# Patient Record
Sex: FEMALE | Race: WHITE
[De-identification: names, ages, dates, MRNs, and addresses within clinical notes are randomized per-mention and may not be internally consistent; named-entity substitution may affect disease eponyms.]

---
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
## 2018-04-20 ENCOUNTER — HOSPITAL ENCOUNTER (EMERGENCY)
Dept: HOSPITAL 25 - UCCORT | Age: 18
Discharge: HOME | End: 2018-04-20
Payer: COMMERCIAL

## 2018-04-20 VITALS — SYSTOLIC BLOOD PRESSURE: 137 MMHG | DIASTOLIC BLOOD PRESSURE: 78 MMHG

## 2018-04-20 DIAGNOSIS — L01.00: Primary | ICD-10-CM

## 2018-04-20 PROCEDURE — G0463 HOSPITAL OUTPT CLINIC VISIT: HCPCS

## 2018-04-20 PROCEDURE — 99212 OFFICE O/P EST SF 10 MIN: CPT

## 2018-04-20 NOTE — XMS REPORT
Miley Doyle

 Created on:April 3, 2018



Patient:Miley Doyle

Sex:Female

:2000

External Reference #:[id number]





Demographics







 Address  [address] [place] [address]

 

 Home Phone  [phone]

 

 Mobile Phone  [phone]

 

 Email Address  [email or web address]

 

 Preferred Language  English

 

 Marital Status  Declined to Specify/Unknown

 

 Judaism Affiliation  Unknown

 

 Race  White

 

 Ethnic Group  Declined to Specify/Unknown









Author







 Organization  [facility]

 

 Address  [address] [address]

 

 Phone  [phone]









Support







 Name  Relationship  Address  Phone

 

 Libia Govea  Mother  Unavailable  [phone]

 

 Ollie Caruso  Significant Other  Unavailable  [phone]









Care Team Providers







 Name  Role  Phone

 

 Karen Gabriel, NP  Primary Care Physician  Unavailable









Payers







 Type  Date  Identification Numbers  Payment Provider  Subscriber

 

 Commercial    Policy Number: [id number]  [facility]  Miley Doyle









 PayID: [id number]  [address]









 [place] [address]







Problems







 Date  Description  Provider  Status

 

 Onset: 2017  Generalized anxiety disorder  Karen Gabriel, NP  
Active

 

 Onset: 2017  Migraine without aura, not  Digiovanna, Karen, NP  Active



   refractory    

 

 Onset: 2017  von Willebrand disorder  DigiovannaKayleneKaren, NP  Active







Family History







 Date  Family Member(s)  Problem(s)  Comments

 

   General  Cancer, Breast  multiple members on mother's side, not mother



       or MGM

 

   General  Heart Disease  MGF has pacemaker

 

   General  ADHD  brother

 

   Mother  Thyroid Disease  

 

   Mother  Hypertension  

 

   Grandfather  Cancer, Bone  

 

   Grandmother  Cancer, Lung  







Social History







 Type  Date  Description  Comments

 

 Education    Currently working on Vocational Degree  working on [employer]

 

 Marital Status    Single  

 

 Occupation      

 

 Work Status    Currently Working  [employer]

 

 ETOH Use    Denies alcohol use  

 

 Smoking    Patient has never smoked  

 

 Daily Caffeine    Does Not Consume Caffeine  







Allergies, Adverse Reactions, Alerts







 Date  Description  Reaction  Status  Severity  Comments

 

 2017  Lactose    active    

 

 2017  NKDA    inactive    







Medications







 Medication  Date  Status  Form  Strength  Qnty  SIG  Indications  Ordering



                 Provider

 

 Imipramine HCL  00/00/0  Active  Tablets  25mg  30tabs  Take One  F41.1  
Digiovanna,



   000          Tablet By    Karen,



             Mouth At    NP



             Bedtime    









 F33.9









 Hydroxychloroquine    Active  Tablets  200mg    1 po bid    Anabella Kovacs DR

 

                 

 

 Buspirone HCL  10/10/2017 -  Hx  Tablets  5mg  60tabs  1 po  F41  Digiovanna,



   2017          daily  .1  Karen,



             for 1    NP



             week    



             then    



             twice    



             daily    

 

 Vitamin D  2017 -  Hx  Tablets  2000Unit  OTC  1 by    Digiovanna,



   11/10/2017          mouth    Karen,



             twice    NP



             every    



             day    







Immunizations







 CPT Code  Status  Date  Vaccine  Reaction  Lot #

 

 [id number]  Given  12/10/2013  HPV Vaccine (Gardasil) 3 Dose Schedule  nysiis  

 

 82560  Given  2012  HPV Vaccine (Gardasil) 3 Dose Schedule  nysiis  

 

 67304  Given  2011  Menactra/Menveo Meningococcal Vaccine  nysiis  

 

 32877  Given  2011  HPV Vaccine (Gardasil) 3 Dose Schedule  nysiis  

 

 59675  Given  09/15/2010  Varicella (Chicken Pox) Immunization  nysiis  

 

 76883  Given  09/15/2010  Tdap (Adacel) Ages 7 And Above Only  nysiis  

 

 57964  Given  2003  IPV / Poliomyelitis Immunization  nysiis  

 

 98722  Given  2003  MMR Virus Immunization  nysiis  

 

 39376  Given  2003  DTaP Immunization 7 Yrs &amp; Younger  nysiis  

 

 86764  Given  2000  Hib ACTHiB Vaccine 4 Dose Schedule  nysiis  

 

 29262  Given  2000  DTaP Immunization 7 Yrs &amp; Younger  nysiis  

 

 90535  Given  2000  Varicella (Chicken Pox) Immunization  nysiis  

 

 66440  Given  2000  Hepatitis B Vac Ped/Adolescent 3 Dose  nysiis  



       Schedule    

 

 32993  Given  2000  Hepatitis B Vac Ped/Adolescent 3 Dose  nysiis  



       Schedule    

 

 82010  Given  2000  IPV / Poliomyelitis Immunization  nysiis  

 

 32144  Given  2000  DTaP Immunization 7 Yrs &amp; Younger  nysiis  

 

 69942  Given  2000  Hib ACTHiB Vaccine 4 Dose Schedule  nysiis  

 

 11710  Given  2000  IPV / Poliomyelitis Immunization  nysiis  

 

 95288  Given  2000  MMR Virus Immunization  nysiis  

 

 64074  Given  2000  DTaP Immunization 7 Yrs &amp; Younger  nysiis  

 

 81008  Given  2000  Hib ACTHiB Vaccine 4 Dose Schedule  nysiis  

 

 11107  Given  1998  IPV / Poliomyelitis Immunization  nysiis  

 

 24281  Given  1998  DTaP Immunization 7 Yrs &amp; Younger  nysiis  

 

 45574  Given  1998  Hib ACTHiB Vaccine 4 Dose Schedule  nysiis  

 

 03827  Given  1998  Hepatitis B Vac Ped/Adolescent 3 Dose  nysiis  



       Schedule    







Vital Signs







 Date  Vital  Result  Comment

 

 2018  Body Temperature  99.2 F  tympanic









 Weight  196.00 lb  

 

 Weight Percentile  97th  

 

 Heart Rate  88 /min  

 

 BP Systolic  114 mmHg  

 

 BP Diastolic  68 mmHg  

 

 Respiratory Rate  16 /min  

 

 Height  63.5 inches  5'3.50"

 

 Height Percentile  38 %  

 

 BMI (Body Mass Index)  34.2 kg/m2  

 

 Body Mass Index Percentile  97 %  









 2018  Weight  193.00 lb  









 Weight Percentile  97th  

 

 Heart Rate  76 /min  

 

 BP Systolic  118 mmHg  

 

 BP Diastolic  76 mmHg  

 

 Respiratory Rate  18 /min  

 

 Height  63.5 inches  5'3.50"

 

 Height Percentile  38 %  

 

 BMI (Body Mass Index)  33.6 kg/m2  

 

 Body Mass Index Percentile  97 %  









 2017  Weight  191.00 lb  









 Weight Percentile  97th  

 

 Heart Rate  96 /min  

 

 BP Systolic  132 mmHg  

 

 BP Diastolic  88 mmHg  

 

 Respiratory Rate  14 /min  

 

 Height  63.5 inches  5'3.50"

 

 Height Percentile  38 %  

 

 BMI (Body Mass Index)  33.3 kg/m2  

 

 Body Mass Index Percentile  97 %  









 11/10/2017  Weight  193.00 lb  









 Weight Percentile  97th  

 

 Heart Rate  72 /min  

 

 BP Systolic  120 mmHg  

 

 BP Diastolic  78 mmHg  

 

 Respiratory Rate  18 /min  

 

 Height  63.5 inches  5'3.50"

 

 Height Percentile  38 %  

 

 BMI (Body Mass Index)  33.6 kg/m2  

 

 Body Mass Index Percentile  97 %  









 10/24/2017  Weight  189.00 lb  









 Weight Percentile  96th  

 

 Heart Rate  94 /min  

 

 BP Systolic  120 mmHg  

 

 BP Diastolic  70 mmHg  

 

 Respiratory Rate  16 /min  









 10/10/2017  Weight  192.00 lb  









 Weight Percentile  97th  

 

 Heart Rate  106 /min  

 

 BP Systolic  118 mmHg  

 

 BP Diastolic  68 mmHg  

 

 Respiratory Rate  17 /min  

 

 Height  64.5 inches  5'4.50"

 

 Height Percentile  54 %  

 

 BMI (Body Mass Index)  32.4 kg/m2  

 

 Body Mass Index Percentile  96 %  









 09/15/2017  Body Temperature  98.9 F  









 Weight  187.38 lb  

 

 Weight Percentile  96th  

 

 Heart Rate  86 /min  

 

 BP Systolic  142 mmHg  

 

 BP Diastolic  68 mmHg  

 

 BP Systolic Recheck  102 mmHg  

 

 BP Diastolic Recheck  76 mmHg  

 

 Respiratory Rate  18 /min  

 

 Height  64.5 inches  5'4.50"

 

 Height Percentile  54 %  

 

 BMI (Body Mass Index)  31.7 kg/m2  

 

 Body Mass Index Percentile  96 %  









 2017  Body Temperature  97.9 F  









 Weight  185.00 lb  

 

 Weight Percentile  96th  

 

 Heart Rate  84 /min  

 

 BP Systolic  132 mmHg  

 

 BP Diastolic  68 mmHg  

 

 BP Systolic Recheck  108 mmHg  

 

 BP Diastolic Recheck  68 mmHg  

 

 Respiratory Rate  18 /min  

 

 Height  64.5 inches  5'4.50"

 

 Height Percentile  54 %  

 

 BMI (Body Mass Index)  31.3 kg/m2  

 

 Body Mass Index Percentile  95 %  

 

 Last Menstrual Period  2239149  







Results







 Test  Date  Test  Result  H/L  Range  Note

 

 Ua RFX Micro &amp; Culture II  2018  Urine Color  YELLOW    Yellow  1









 Urine Clarity  CLEAR    Clear  1

 

 Urine Glucose - Dipstick  NEGATIVE mg/dL    Negative  1

 

 Urine Bilirubin - Dipstick  NEGATIVE    Negative  1

 

 Urine Ketone  NEGATIVE mg/dL    Negative  1

 

 Urine Specific Gravity  1.010    1.010-1.030  1

 

 Urine Blood  NEGATIVE    Negative  1

 

 Urine PH  7.0    6.5-7.5  1

 

 Urine Protein - Dipstick  NEGATIVE mg/dL    Negative  1

 

 Urine Urobilinogen - Dipstick  0.2 E.U./dL    0.2-1.0  1

 

 Urine Nitrite - Dipstick  NEGATIVE    Negative  1

 

 Urine Leuk Esterase  NEGATIVE    Negative  1

 

 Source:  URINE, CLEAN CAT &lt;SEE NOTE&gt;      1, 2









 Comprehensive Metabolic Panel  2018  Glucose  85 mg/dL      1









 BUN  7 mg/dL    7-18  1

 

 Creatinine  0.7 mg/dL    0.6-1.3  1

 

 Glom Filtration Rate, Estimate  &gt;60 mL/min    &gt;60  1

 

 If   &gt;60 mL/min    &gt;60  1, 3

 

 BUN/Creat  10.0 ratio      1

 

 Sodium  141 mmol/L    136-145  1

 

 Potassium  3.7 mmol/L    3.5-5.1  1

 

 Chloride  107 mmol/L      1

 

 Carbon Dioxide  26 mmol/L    21-32  1

 

 Anion Gap  8 mEq/L    8-16  1

 

 Calcium  8.9 mg/dL    8.5-10.1  1

 

 Total Protein  7.8 g/dL    6.4-8.2  1

 

 Albumin  3.9 g/dL    3.4-5.0  1

 

 Globulin  3.9 g/dL    1.9-4.3  1

 

 Alb/Glob  1.0 ratio      1

 

 Bilirubin,Total  0.5 mg/dL    0.2-1.0  1

 

 Sgot/Ast  16 U/L    15-37  1

 

 SGPT/Alt  19 U/L    12-78  1

 

 Alkaline Phosphatase  62 U/L      1









 Laboratory test finding  2018  CK  97 U/L      1









 Troponin-I  &lt; 0.015 ng/mL      1, 4









 Laboratory test  2018  HCG,Serum (Qualitative)  NEGATIVE    (Negative)  1
, 5



 finding            

 

 CBS W/Automated Diff  2018  White Blood Count  4.9 K/uL    3.1-10.7  1









 Red Blood Count  4.47 M/uL    3.90-5.40  1

 

 Hemoglobin  13.7 gm/dL    11.6-15.8  1

 

 Hematocrit  39.2 %    36.0-46.1  1

 

 Mean Cell Volume  87.7 fl    80.9-99.0  1

 

 Mean Corpuscular HGB  30.6 pg    25.9-32.7  1

 

 Mean Corpuscular HGB Conc  34.9 g/dL  High  30.8-34.3  1

 

 Platelet Count  247 K/uL    155-360  1

 

 Red Cell Distri Width SD  40.1 fl    3-47  1

 

 Red Cell Distri Width %CV  12.9 %    11.7-14.4  1

 

 Mean Platelet Volume  8.9 fL    8.9-12.4  1

 

 Neut%  51.7 %    28.0-68.0  1

 

 Lymph %  38.9 %    20.0-42.0  1

 

 Mono %  8.6 %    4.3-13.2  1

 

 Eo%  0.2 %    0.0-6.6  1

 

 Bas%  0.6 %    0.0-1.1  1

 

 Neut#  2.51 K/uL    1.8-7.0  1

 

 Lymph #  1.89 K/uL    1.0-4.0  1

 

 Mono #  0.42 K/uL    0.3-0.9  1

 

 Eos #  0.01 K/uL    0.0-0.5  1

 

 Baso #  0.03 K/uL    0.0-0.1  1









 Protime  2018  Protime  13.0 seconds    12.0-14.4  1









 Inr  1.0    0.9-1.1  1, 6









 Laboratory test finding  2018  Act Partial Thrombo  32.7 seconds    23.4-
35.0  1, 7



     Time        

 

 CBC + Diff, Plat Count  2018  WBC Num Bld Auto  4.5 10*3/uL    4.5-13  









 RBC Num Bld Auto  4.52 10*6/uL    4.1-5.3  

 

 Hgb Bld-mCnc  13.7 g/dL    11.5-15.5  

 

 Hct VFr Bld Auto  39.2 %    36-45  

 

 MCV RBC Auto  86.8 fL    80-96  

 

 MCH RBC Qn Auto  30.3 pg    27-33  

 

 MCHC RBC Auto-mCnc  34.9 g/dL    32.0-36.0  

 

 RDW RBC Auto-Rto  12.9 %    11.5-14.5  

 

 Platelet Num Bld Auto  247 10*3/uL    150-400  

 

 Differential method Bld  Automated Diff      

 

 Neutrophils/leuk NFr Bld Auto  44 %    28-68  

 

 Lymphocytes/leuk NFr Bld Auto  47 %    13-52  

 

 Monocytes/leuk NFr Bld Auto  8 %    0-11  

 

 Eosinophil/leuk NFr Bld Auto  0 %    0-5  

 

 Basophils/leuk NFr Bld Auto  1 %    0-2  

 

 Neutrophils Num Bld Auto  1.95 10*3/uL    1.8-7.0  

 

 Lymphocytes Num Bld Auto  2.10 10*3/uL    1.2-4.0  

 

 Monocytes Num Bld Auto  0.37 10*3/uL    0-0.8  

 

 Eosinophil Num Bld Auto  0.01 10*3/uL    0-0.5  

 

 Basophils Num Bld Auto  0.03 10*3/uL    0-0.2  

 

 nRBC/100 WBC Bld Auto-Rto  0 /100{WBCs}    0-0  









 Comprehensive Metabolic Pan  2018  Albumin SerPl BCG-mCnc  4.6 g/dL    
3.5-5.2  









 Bilirub SerPl-mCnc  0.6 mg/dL    &lt;1.2  

 

 Calcium SerPl-mCnc  9.5 mg/dL    8.6-10.0  

 

 Chloride SerPl-sCnc  96 mmol/L  Low    

 

 Creat SerPl-mCnc  0.75 mg/dL    0.4-1.0  

 

 Glucose SerPl-mCnc  76 mg/dL      

 

 Alp SerPl-cCnc  54 U/L    45-87  

 

 Potassium SerPl-sCnc  4.3 mmol/L    3.5-5.1  

 

 Prot SerPl-mCnc  7.0 g/dL    6.4-8.3  

 

 Sodium SerPl-sCnc  133 mmol/L  Low  136-145  

 

 Ast SerPl-cCnc  16 U/L    &lt;32  

 

 BUN SerPl-mCnc  14 mg/dL    6-20  

 

 Osmolality SerPl Calc  275 mosm/kg    275-300  

 

 Creat/Urea nit SerPl  19      

 

 Hco3 Ser-sCnc  25 mmol/L    22-29  

 

 Alt SerPl-cCnc  14 U/L    &lt;33  

 

 Anion Gap3 SerPl-sCnc  12 mmol/L    8-15  

 

 Albumin/Glob SerPl  1.9      

 

 GFR/Bsa pred.non black SerPl MDRD-ArVRat  &gt;90 mL/min/1.73m2    &gt;60  

 

 GFR/Bsa pred.black SerPl MDRD-ArVRat  &gt;90 mL/min/1.73m2    &gt;60  









 Laboratory test finding  2018  CRP Highly Sensitive  0.4 mg/L    &lt;3.0
  8









 Rheumatoid Factor Qnt  &lt;10 IU/mL    &lt;14  9

 

 Sed Rate - Esr  5 mm/hr    &lt;20  









 Veronique  2018  Veronique Homogen Titr Ser  250 1/dil  High  0-49  









 Veronique Speckled Titr Ser  1250 1/dil  High  0-49  

 

 Veronique Rim Titr Ser  &lt;50 1/dil    0-49  

 

 Veronique nucleolar Titr Ser  &lt;50 1/dil    0-49  









 Laboratory test finding  01/15/2018  Magnesium  2.1 mg/dL    1.8-2.4  10









 TSH Reflex FT4 and/or FT3  3.05 uIU/mL    0.30-4.20  10

 

 HCG,Serum (Qualitative)  NEGATIVE    (Negative)  10, 11









 Comprehensive Metabolic Panel  01/15/2018  Glucose  72 mg/dL  Low    10









 BUN  9 mg/dL    7-18  10

 

 Creatinine  0.6 mg/dL    0.6-1.3  10

 

 Glom Filtration Rate, Estimate  &gt;60 mL/min    &gt;60  10

 

 If   &gt;60 mL/min    &gt;60  10, 12

 

 BUN/Creat  15.0 ratio      10

 

 Sodium  141 mmol/L    136-145  10

 

 Potassium  4.1 mmol/L    3.5-5.1  10

 

 Chloride  108 mmol/L  High    10

 

 Carbon Dioxide  30 mmol/L    21-32  10

 

 Anion Gap  3 mEq/L  Low  8-16  10

 

 Calcium  9.0 mg/dL    8.5-10.1  10

 

 Total Protein  7.3 g/dL    6.4-8.2  10

 

 Albumin  3.8 g/dL    3.4-5.0  10

 

 Globulin  3.5 g/dL    1.9-4.3  10

 

 Alb/Glob  1.1 ratio      10

 

 Bilirubin,Total  0.4 mg/dL    0.2-1.0  10

 

 Sgot/Ast  15 U/L    15-37  10

 

 SGPT/Alt  21 U/L    12-78  10

 

 Alkaline Phosphatase  57 U/L      10









 Drugs Of Abuse-Urine Screen 7  01/15/2018  Amphetamines (Urine)  Negative      
10









 Barbiturates (Urine)  Negative      10

 

 Benzodiazepines (Urine)  Negative      10

 

 Cannabinoids (Urine)  Negative      10

 

 Cocaine Metabolite (Urine)  Negative      10

 

 Methadone (Urine)  Negative      10

 

 Opiates (Urine)  Negative      10

 

 Urine Cutoffs  *      10, 13









 Ua RFX Micro &amp; Culture II  01/15/2018  Urine Color  YELLOW    Yellow  10









 Urine Clarity  CLEAR    Clear  10

 

 Urine Glucose - Dipstick  NEGATIVE mg/dL    Negative  10

 

 Urine Bilirubin - Dipstick  NEGATIVE    Negative  10

 

 Urine Ketone  NEGATIVE mg/dL    Negative  10

 

 Urine Specific Gravity  &lt;=1.005  Low  1.010-1.030  10

 

 Urine Blood  NEGATIVE    Negative  10

 

 Urine PH  5.5  Low  6.5-7.5  10

 

 Urine Protein - Dipstick  NEGATIVE mg/dL    Negative  10

 

 Urine Urobilinogen - Dipstick  0.2 E.U./dL    0.2-1.0  10

 

 Urine Nitrite - Dipstick  NEGATIVE    Negative  10

 

 Urine Leuk Esterase  NEGATIVE    Negative  10

 

 Source:  URINE, CLEAN CAT &lt;SEE      10, 14



   NOTE&gt;      









 CBS W/Automated Diff  01/15/2018  White Blood Count  3.7 K/uL    3.1-10.7  10









 Red Blood Count  4.48 M/uL    3.90-5.40  10

 

 Hemoglobin  13.4 gm/dL    11.6-15.8  10

 

 Hematocrit  39.7 %    36.0-46.1  10

 

 Mean Cell Volume  88.6 fl    80.9-99.0  10

 

 Mean Corpuscular HGB  29.9 pg    25.9-32.7  10

 

 Mean Corpuscular HGB Conc  33.8 g/dL    30.8-34.3  10

 

 Platelet Count  245 K/uL    155-360  10

 

 Red Cell Distri Width SD  40.9 fl    3-47  10

 

 Red Cell Distri Width %CV  12.9 %    11.7-14.4  10

 

 Mean Platelet Volume  9.0 fL    8.9-12.4  10

 

 Neut%  43.3 %    28.0-68.0  10

 

 Lymph %  48.1 %  High  20.0-42.0  10

 

 Mono %  7.8 %    4.3-13.2  10

 

 Eo%  0.3 %    0.0-6.6  10

 

 Bas%  0.5 %    0.0-1.1  10

 

 Neut#  1.61 K/uL  Low  1.8-7.0  10

 

 Lymph #  1.79 K/uL    1.0-4.0  10

 

 Mono #  0.29 K/uL  Low  0.3-0.9  10

 

 Eos #  0.01 K/uL    0.0-0.5  10

 

 Baso #  0.02 K/uL    0.0-0.1  10









 Vitamin B12 And Folate  01/15/2018  Vitamin B12  1322 pg/mL  High  193-986  10









 Folic Acid  15.5 ng/mL    3.1-17.5  10









 Urine HCG (Qualitative)  2017  Urine HCG (Qualitative)  NEGATIVE    
Negative  15, 16









 Source:  URINE, CLEAN CAT &lt;SEE NOTE&gt;      15, 17









 Ua RFX Micro &amp; Culture II  2017  Urine Color  YELLOW    Yellow  15









 Urine Clarity  CLEAR    Clear  15

 

 Urine Glucose - Dipstick  NEGATIVE mg/dL    Negative  15

 

 Urine Bilirubin - Dipstick  NEGATIVE    Negative  15

 

 Urine Ketone  NEGATIVE mg/dL    Negative  15

 

 Urine Specific Gravity  &lt;=1.005  Low  1.010-1.030  15

 

 Urine Blood  NEGATIVE    Negative  15

 

 Urine PH  6.0  Low  6.5-7.5  15

 

 Urine Protein - Dipstick  NEGATIVE mg/dL    Negative  15

 

 Urine Urobilinogen - Dipstick  0.2 E.U./dL    0.2-1.0  15

 

 Urine Nitrite - Dipstick  NEGATIVE    Negative  15

 

 Urine Leuk Esterase  NEGATIVE    Negative  15

 

 Source:  URINE, CLEAN CAT &lt;SEE      15, 18



   NOTE&gt;      









 Laboratory test finding  2017  Vit D25oh  23 ng/mL  Low    









 TSH  1.90 uIU/mL    0.35-4.94  









 Lipid  2017  Cholesterol  195 mg/dL      









 Triglycerides  48 mg/dL      

 

 HDL  66 mg/dL    35-85  19

 

 Chol/ HDL Ratio  3.0 ratio  Low  3.7-5.6  

 

 VLDL  10 mg/dL    2-29  

 

 LDL (Calc)  120 mg/dL  High  20-99  20









 Comprehensive Met Panel-[facility]  2017  Sodium  140 mmol/L    135-146  21









 Potassium  4.2 mmol/L    3.5-5.2  

 

 Chloride#  103 mmol/L      22

 

 Carbon Dioxide  30 mmol/L    24-34  

 

 Glucose  77 mg/dL      

 

 BUN  9 mg/dL    6-26  

 

 Creatinine  0.7 mg/dL    0.5-1.4  

 

 Calcium  9.7 mg/dL    8.5-10.2  

 

 Total Protein  7.1 g/dL    6.0-8.0  

 

 Albumin  4.5 g/dL    3.6-4.9  

 

 Globulin  2.6 g/dL    2.0-3.5  

 

 A/G Ratio  1.7 Ratio    1.0-2.2  

 

 Total Bilirubin  0.6 mg/dL    0.1-1.3  

 

 Alkaline Phosphatase  59 U/L      

 

 Alt  12 U/L    3-42  

 

 Ast  16 U/L    8-42  

 

  Shu Egfr  &gt;60    &gt;60  23

 

 Non  Shu Egfr  &gt;60    &gt;60  24

 

 Anion Gap  7 mmol/L    7-16  25









 CBC With Auto Diff  2017  WBC  5.6 K/uL    4.1-11.0  









 RBC  4.34 M/uL    4.00-5.40  

 

 Hemoglobin  12.9 gm/dL    12.0-16.0  

 

 Hematocrit  38.1 %    36.0-47.0  

 

 MCV  87.8 fL    80.0-97.0  

 

 MCH  29.8 pg    27.0-32.0  

 

 MCHC  33.9 g/dL    32.0-36.0  

 

 RDW  13.3 %    11.5-14.5  

 

 PLT Count  322 K/ul    140-400  

 

 Neutrophil  44.2 %    35.0-75.0  

 

 Lymphocyte  48.3 %    16.0-52.0  

 

 Monocyte  6.7 %    2.0-10.0  

 

 Eosinophil  0.0 %    0.0-5.0  

 

 Basophil  0.8 %    0.0-4.0  

 

 Abs Neutrophils  2.5 K/uL    2.1-8.0  

 

 Abs Lymphocytes  2.7 K/uL    0.8-5.5  

 

 Abs Monocytes  0.4 K/uL    0.1-1.0  

 

 Abs Eosinophils  0.0 K/uL    0.0-0.5  

 

 Abs Basophils  0.0 K/uL    0.0-0.3  









 1  NAUSEA, WEAK, SHAKEY. GETTING CHECKED FOR MS

 

 2  URINE, CLEAN CATCH

 

 3  Note:



   Persistent reduction for 3 months or more in an eGFR &lt;60



   mL/min/1.73 m2 defines CKD.  Patients with eGFR values &gt;/=60



   mL/min/1.73 m2 may also have CKD if evidence of persistent



   proteinuria is present.



   



   The original MDRD equation for estimated GFR is not valid



   for patients less than 18 years of age.



   



   Additional information may be found at www.[facility].org.

 

 4  0.0 - 0.045 ng/mL: Normal



   0.046 - 0.5 ng/mL: Suggestive



   0.6 - 1.5 ng/mL: Consistent

 

 5  Method: Quidel QuickVue One-Step Immunoassay

 

 6  THERAPEUTIC INR RANGE: 2.0 - 3.0   DVT, Pulmonary embolus,



   prophylaxis against venous thrombosis or systemic



   embolization  in high risk patients. 2.5 - 3.5   Mechanical



   heart valves

 

 7  Is patient on anticoagulants? Coumadin

 

 8  (NOTE)



   CRPHS (mg/L)    CVD risk



   ------------------------



   &lt;1.0         low



   1.0-3.0      average



   &gt;3.0         high

 

 9  Confirmed

 

 10  MUSCLE SPASMS, FINGERS TINGLING AND PAINFUL

 

 11  Method: Quidel QuickVue One-Step Immunoassay

 

 12  Note:



   Persistent reduction for 3 months or more in an eGFR &lt;60



   mL/min/1.73 m2 defines CKD.  Patients with eGFR values &gt;/=60



   mL/min/1.73 m2 may also have CKD if evidence of persistent



   proteinuria is present.



   



   The original MDRD equation for estimated GFR is not valid



   for patients less than 18 years of age.



   



   Additional information may be found at www.kdoqi.org.

 

 13  URINE SPECIMENS ARE SCREENED AT THE LISTED



   CUTOFFS



   



   DRUG CLASS                 INITIAL TEST LEVEL



   



   Amphetamines                   1000 ng/mL



   Barbiturates                    200 ng/mL



   Benzodiazepines                 200 ng/mL



   Cannabinoids                     50 ng/mL



   Cocaine Metabolite              300 ng/mL



   Methadone                       300 ng/mL



   Opiates                         300 ng/mL



   



   Any PRESUMPTIVE POSITIVE findings are UNCONFIRMED.



   Confirmatory testing is suggested if findings are



   unexpected.



   Please contact laboratory if confirmatory testing is



   desired. SPECIMENS ARE HELD FOR 72 HOURS.

 

 14  URINE, CLEAN CATCH

 

 15  LOW BLOOD PRESSURE, LIGHT HEADED

 

 16  FIRST MORNING SPECIMENS GENERALLY CONTAIN THE HIGHEST



   CONCENTRATION OF HCG AND ARE RECOMMENDED FOR EARLY DETECTION



   OF PREGNANCY.



   



   Method: [employer] QuickVue One-Step Immunoassay

 

 17  URINE, CLEAN CATCH

 

 18  URINE, CLEAN CATCH

 

 19  Per NCEP ATP III Guidelines:



   Results lower than 40 mg/dL are suggestive of increased risk for



   coronary artery disease.  Results &gt; or=to 60 mg/dL are considered a



   negative risk factor.

 

 20  Per NCEP ATP III Guidelines:



   Normal Population &lt;130



   Patients with medical conditions: CHD/DM



   Optimal: &lt;100



   Borderline high: 130-159



   High: 160-189



   Very high: &gt;189

 

 21  Updated reference range on new analyzer 3-2017

 

 22  Updated reference range on new analyzer 3-2017

 

 23  Concerning GFR Guidelines for  [place]:



   Normal function or mild renal disease, if clinically at risk:  &gt;/=60



   mL/min



   Moderately decreased:  30-59



   Severely decreased:  15-29



   Renal failure:  &lt;15

 

 24  Concerning GFR Guidelines:



   Normal function or mild renal disease, if clinically at risk:  &gt;/=60



   mL/min



   Moderately decreased:  30-59



   Severely decreased:  15-29



   Renal failure:  &lt;15



   



   Glomerular Filtration Rate (GFR) is estimated based on the MDRD



   equation, which assumes a steady state for creatinine as recommended



   by the National Kidney Disease Education Program in conjunction with



   the National Institutes of Health and the National Kidney Foundation.



   



   Clinical conditions in which it may be necessary to measure GFR by



   using clearance methods include extremes of age and body size, severe



   malnutrition or obesity, diseases of skeletal muscle, paraplegia or



   quadriplegia, vegetarian diet, rapidly changing kidney function, and



   calculation of the dose of potentially toxic drugs that are excreted



   by the kidneys.

 

 25  Updated reference range on new analyzer 3-2017







Procedures







 Date  CPT Code  Description  Status

 

 10/10/2017  19637  X-Ray Spine Cervical, 6 Or More Views  Completed

 

 2017  21511  Screening Hearing Test  Completed







Encounters







 Type  Date  Location  Provider  CPT E/M  Dx

 

 Office Visit  2018  8:30a  [facility]  Karen Gabriel [place]  [id number]  M62.838









 R20.9

 

 H53.8

 

 F33.9

 

 F41.1

 

 R51









 Office Visit  2017  4:00p  [facility]  Karen Gabriel NP  [address]  F41.1









 F33.9

 

 R51









 Office Visit  11/10/2017  4:15p  [facility]  Rosalie Conley [place]  [id number]  R21

 

 Office Visit  10/24/2017  2:30p  [facility]  Karen Gabriel NP  [id number]  F41.1









 F33.9









 Office Visit  10/10/2017  2:45p  [facility]  Karen Gabriel [place]  [id number]  R51









 M54.2

 

 F41.1

 

 F33.9









 Office Visit  09/15/2017  2:30p  [facility]  Karen Gabriel NP  [address]  J06.9

 

 Office Visit  2017  2:00p  [facility]  Karen Gabriel NP  [address]  Z00.00









 D68.0

 

 G43.009

 

 F41.1

 

 E55.9

 

 R31.9

 

 Z83.49

 

 E78.00

 

 Z68.31







Plan of Care

Future Appointment(s):2018 11:30 am - Karen Gabriel NP at [facility]  3:00 pm - Karen Gabriel NP at [facility]2018 - Karen Gabriel NPM62.838 Other muscle spasmComments:Continue to follow with 
rheumatology and ozcibugfaL71.1 MyalgiaComments:Continue PlaquenilIbuprofen 
600mg every 6 hours with food alternating Tylenol 1000mg every 6 hours.F41.1 
Generalized anxiety disorderComments:Will continue Imipramine for now and re-
evaluate after M-S symptoms are under controlFollow up:3 months for 
kycuyduG15.5 Low back painComments:Urine dip normal and reassuringTylenol and 
ibuprofen as above.

## 2018-04-20 NOTE — XMS REPORT
Miley Doyle

 Created on:2018



Patient:Miley Doyle

Sex:Female

:2000

External Reference #:[id number]





Demographics







 Address  [address] [place] [address]

 

 Home Phone  [phone]

 

 Work Phone  [phone]

 

 Preferred Language  English

 

 Marital Status  Not  Or 

 

 Christianity Affiliation  Unknown

 

 Race  White

 

 Ethnic Group  Not  Or 









Author







 Organization  [facility]

 

 Address  [address], [address] [place] [address]

 

 Phone  [phone]









Support







 Name  Relationship  Address  Phone

 

 Libia Nieves  Mother  Unavailable  [phone]









Care Team Providers







 Name  Role  Phone

 

 Kehinde Gabriel MD  Care Team Information   Unavailable

 

 Kehinde Gabriel MD  Primary Care Physician  Unavailable









Payers







 Type  Date  Identification Numbers  Payment Provider  Subscriber

 

 Commercial    Policy Number: [id number]  [facility]  Miley Doyle









 PayID: [id number]  [address]









 [place] [address]







Problems







 Date  Description  Provider  Status

 

 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

 

 Onset: 2018  Visual discomfort  See Smith MD  Active







Family History







 Date  Family Member(s)  Problem(s)  Comments

 

   Mother  Alive  

 

   Mother  Heart Failure  

 

   Mother  Wears Glasses  







Social History







 Type  Date  Description  Comments

 

 Marital Status    Single  

 

 ETOH Use    Denies alcohol use  

 

 Smoking    Patient has never smoked  







Allergies, Adverse Reactions, Alerts







 Date  Description  Reaction  Status  Severity  Comments

 

 2018  NKDA    active    







Medications







 Medication  Date  Status  Form  Strength  Qnty  SIG  Indications  Ordering



                 Provider

 

 Imipramine HCL  /  Active  Tablets  25mg    1 PO qd    Unknown



   0000              

 

 Hydroxychloroquine  /  Active  Tablets  200mg    Take One    Unknown



 Sulfate  0000          Tablet    



             By Mouth    



             Twice A    



             Day    

 

                 

 

 Meclizine HCL  /  Hx  Tablets  25mg    Take One    Unknown



   0000 -          Tablet    



   /          By Mouth    



             Four    



             Times A    



             Day as    



             Needed    



             For    



             Vertigo    

 

 Vitamin D  /  Hx  Capsules  39122Mivz    Take 1    Unknown



 (Ergocalciferol)  0000 -          Capsule    



   /          By Mouth    



             Every    



             Week    

 

 Buspirone HCL  /  Hx  Tablets  5mg        Digiovann



   0000 -              a,



   02/12/              Karen



                 J, FNP







Results







 Description

 

 No Information







Procedures







 Date  CPT Code  Description  Status

 

 2018  21751  Scanning Computerized Ophthalmic Diagnostic Imaging,  
Completed



     Posterior  

 

 2018  72867  Visual Field Exam Extended, Unilateral Or Bilateral  
Completed

 

 2018  57243  Eye Exam Est Patient Comprehensive  Completed

 

 2018  13492  Eye Exam New Patient Comprehensive  Completed







Plan of Care

Future Appointment(s):04/10/2018 10:45 am - See Smith MD at [facility]/
08/2018  9:45 am - See Smith MD at [facility]/30/2018 - See Smith MDH53.141 Visual discomfort, right eyeComments:- no sign of retinopathy- no 
sign of optic neuropathy- no sign of maculopathy- in setting of 
hydroxychloroquine use, will perform vf 10-2, oct macula in 4-6 weeks for 
baseline testingFollow up:4-6 weeks dilate ou; vf 10-2, oct macula  please sign 
[place]; labs and clinic notes  ofzujdW36.22 Sixth [
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 
increased intracranial pressure- 12 pd esotropia with mild headturn- doing well
- if diplopia, could consider strabismus eval; at this time, would follow

## 2018-04-20 NOTE — UC
Skin Complaint HPI





- HPI Summary


HPI Summary: 


Per RN triage "left forearm scratch, been using hydrogen peroxide to clean, 

area 4 cm in length, warm to the touch"


-started to get red adn feel warm in area surrounding. central scratch part is 

itchy. no fevers or chills. 


-NKDA


-LMP 3 days ago. denies being sexually active and denies being pregnant.








- History of Current Complaint


Chief Complaint: UCSkin


Time Seen by Provider: 04/20/18 17:08


Stated Complaint: SKIN COMPLAINT


Hx Last Menstrual Period: 4/17/18


Pain Intensity: 3





- Allergy/Home Medications


Allergies/Adverse Reactions: 


 Allergies











Allergy/AdvReac Type Severity Reaction Status Date / Time


 


No Known Allergies Allergy   Verified 04/20/18 16:40











Home Medications: 


 Home Medications





Hydroxychloroquine TAB* [Plaquenil TAB*] 200 mg PO DAILY 04/20/18 [History 

Confirmed 04/20/18]











Review of Systems


Constitutional: Negative


Skin: Rash - it felt warm yesterday and today and wanted to makes ure it wasnt 

more infected.


Eyes: Negative


ENT: Negative


Respiratory: Negative


Cardiovascular: Negative


Gastrointestinal: Negative


Genitourinary: Negative


Motor: Negative


Neurovascular: Negative


Musculoskeletal: Negative


Neurological: Negative


Psychological: Negative


Is Patient Immunocompromised?: No


All Other Systems Reviewed And Are Negative: Yes





PMH/Surg Hx/FS Hx/Imm Hx


Previously Healthy: Yes





- Surgical History


Surgical History: None


Surgery Procedure, Year, and Place: DENIES





- Family History


Known Family History: Positive: Cardiac Disease - father with CHF as older adult


   Negative: Hypertension, Diabetes





- Social History


Alcohol Use: None


Substance Use Type: None


Smoking Status (MU): Never Smoked Tobacco


Have You Smoked in the Last Year: No





Physical Exam


Triage Information Reviewed: Yes


Appearance: Well-Appearing, No Pain Distress, Well-Nourished


Vital Signs: 


 Initial Vital Signs











Temp  98.5 F   04/20/18 16:34


 


Pulse  107   04/20/18 16:34


 


Resp  17   04/20/18 16:34


 


BP  137/78   04/20/18 16:34


 


Pulse Ox  98   04/20/18 16:34











Vital Signs Reviewed: Yes


Eye Exam: Normal


ENT Exam: Normal


ENT: Positive: Pharynx normal


Neck exam: Normal


Respiratory Exam: Normal


Respiratory: Positive: Lungs clear, Normal breath sounds, No respiratory 

distress, No accessory muscle use


Cardiovascular Exam: Normal


Cardiovascular: Positive: RRR, No Murmur, Pulses Normal, Brisk Capillary Refill 

- apical HR 90


Musculoskeletal Exam: Normal


Neurological Exam: Normal


Psychological Exam: Normal


Skin: Positive: Other - left forearm with 2 scratches oblong down forearm w/ 

yellow crusted scab down. there is 1-2 inches of blanching erythema 

surrounding. no d/c. warm to touch.





Course/Dx





- Course


Course Of Treatment: -explained impetigo and nature of it. crystal it with tommy wernerck progression of surrounding cellulitis. f/u here if sx worsen but should 

improve daily.  -bactrim to cover MRSA & bactroban topically.





- Diagnoses


Provider Diagnoses: Impetigo





Discharge





- Sign-Out/Discharge


Documenting (check all that apply): Discharge





- Discharge Plan


Condition: Stable


Disposition: HOME


Prescriptions: 


Mupirocin 2% OINT* [Bactroban 2 % Oint*] 1 applic TOPICAL BID 10 Days #1 tube


Sulfamethox/Trimethoprim DS* [Bactrim /160 TAB*] 1 tab PO BID 10 Days #20 

tab


Patient Education Materials:  Impetigo (ED)


Referrals: 


Karen Gabriel [Primary Care Provider] - 5 Days


Additional Instructions: 


-Make sure to take a probiotic daily while on antibiotics to help prevent a 

potential complication of antibiotic use called c diff. Some well known brands 

that can be found OTC are florastor, align and colon health.  Make sure to 

complete the entire prescription unless advised otherwise by your health care 

provider. 





- Billing Disposition and Condition


Condition: STABLE


Disposition: HOME

## 2018-04-20 NOTE — XMS REPORT
Miley Doyle

 Created on:2018



Patient:Miley Doyle

Sex:Female

:2000

External Reference #:[id number]





Demographics







 Address  [address] [place] [address]

 

 Home Phone  [phone]

 

 Email Address  [email or web address]

 

 Preferred Language  English

 

 Marital Status  Not  Or 

 

 Synagogue Affiliation  Unknown

 

 Race  White

 

 Ethnic Group  Not  Or 









Author







 Organization  [facility]

 

 Address  [address] [address]

 

 Phone  [phone]









Support







 Name  Relationship  Address  Phone

 

 Libia Govea  Mother  Unavailable  [phone]









Care Team Providers







 Name  Role  Phone

 

 Karen Gabriel FNP  Primary Care Physician  Unavailable









Payers







 Type  Date  Identification Numbers  Payment Provider  Subscriber

 

 Commercial    Policy Number: [id number]  Kevin Doyle









 Group Number: [id number]  [address]

 

 PayID: [id number]  [place] [address]







Problems







 Date  Description  Provider  Status

 

 Onset: 2018  Visual disturbance  Sagar ePres MD  Active

 

 Onset: 2018  Neuralgia  Sagar Peres MD  Active

 

 Onset: 2018  Skin sensation disturbance  Sagar Peres MD  Active







Social History







 Type  Date  Description  Comments

 

 ETOH Use    Never used alcohol  

 

 Smoking    Patient has never smoked  







Allergies, Adverse Reactions, Alerts







 Date  Description  Reaction  Status  Severity  Comments

 

 2018  NKDA    active    







Medications







 Medication  Date  Status  Form  Strength  Qnty  SIG  Indications  Ordering



                 Provider

 

 Imipramine HCL  /  Active  Tablets  25mg    1 @hs    Digiovanna



   0000              ,



                 Karen,



                 FNP

 

 Hydroxychloroquine  /  Active  Tablets  200mg    Take One    Unknown



 Sulfate  0000          Tablet    



             By Mouth    



             Twice A    



             Day    







Vital Signs







 Date  Vital  Result  Comment

 

 2018  Height  64 inches  5'4"









 Weight  193.50 lb  

 

 Heart Rate  68 /min  

 

 BP Systolic  104 mmHg  

 

 BP Diastolic  86 mmHg  

 

 BMI (Body Mass Index)  33.2 kg/m2  

 

 Height Percentile  45 %  

 

 Weight Percentile  97th  









 2018  Height  64 inches  5'4"









 Weight  191.38 lb  

 

 Heart Rate  78 /min  

 

 BP Systolic Sitting  120 mmHg  

 

 BP Diastolic Sitting  80 mmHg  

 

 BMI (Body Mass Index)  32.8 kg/m2  

 

 Height Percentile  46 %  

 

 Weight Percentile  97th  







Results







 Test  Date  Test  Result  H/L  Range  Note

 

 Laboratory test finding  2018  Vitamin D Total 25(Oh)  21.3 ng/mL    20-
50  









 Vitamin B12  693 pg/mL    180-914  1

 

 TSH (Thyroid Stim Horm)  1.91 mcIU/mL    0.34-5.60  

 

 Nmo Igg  Negative    Negative  2

 

 Lyme Disease Serology  Negative    Negative  3

 

 Folic Acid (Folate)  16.24 ng/mL    &gt;3.99  

 

 Erythrocyte Sed Rate  11 mm/Hr    0-14  

 

 Anti Nuclear Antibody  0.9 U      4









 Cardiolipin Igg/Igm  2018  Phospholipid Ab IgM, S  &lt; 9.4 MPL      5









 Phospholipid Ab IgG  &lt; 9.4 GPL      6









 Comp Metabolic Panel  2018  Sodium  137 mmol/L    133-145  









 Potassium  4.0 mmol/L    3.5-5.0  

 

 Chloride  103 mmol/L    101-111  

 

 Co2 Carbon Dioxide  29 mmol/L    22-32  

 

 Anion Gap  5 mmol/L    2-11  

 

 Glucose  76 mg/dL      

 

 Blood Urea Nitrogen  8 mg/dL    6-24  

 

 Creatinine  0.73 mg/dL    0.51-0.95  

 

 BUN/Creatinine Ratio  11.0    8-20  

 

 Calcium  9.4 mg/dL    8.6-10.3  

 

 Total Protein  7.0 g/dL    6.4-8.9  

 

 Albumin  4.3 g/dL    3.2-5.2  

 

 Globulin  2.7 g/dL    2-4  

 

 Albumin/Globulin Ratio  1.6    1-3  

 

 Total Bilirubin  0.70 mg/dL    0.2-1.0  

 

 Alkaline Phosphatase  45 U/L      

 

 Alt  12 U/L    7-52  

 

 Ast  13 U/L    13-39  

 

 Egfr Non-  102.7    &gt;60  

 

 Egfr   132.1    &gt;60  7









 1  Normal Range 180 to 914



   Indeterminate Range 145 to 180



   Deficient Range  &lt;145

 

 2  Recommend repeat testing in 6 months if clinical suspicion



   is high. Negative result can occur in the setting of



   immunosuppression.



   -------------------ADDITIONAL INFORMATION-------------------



   This test was developed and its performance characteristics



   determined by [facility] in a manner consistent with CLIA



   requirements. This test has not been cleared or approved by



   the U.S. Food and Drug Administration.



   Test Performed by:



   [facility] - [address] [address]

 

 3  Serologic response to B. burgdorferi infection is not



   detected, but cannot rule out early infection during



   which low or undetectable antibody levels to



   B. burgdorferi may be present. If clinically indicated,



   a new serum specimen should be submitted in 7-14 days.



   Test Performed by:



   [facility] - [facility]



   [address] [place] [address]

 

 4  -------------------REFERENCE VALUE--------------------------



   &lt;=1.0 (Negative)



   Test Performed by:



   [facility] - [facility]



   [address] [place] [address]

 

 5  -------------------REFERENCE VALUE--------------------------



   &lt;15.0 (Negative)

 

 6  -------------------REFERENCE VALUE--------------------------



   &lt;15.0 (Negative)



   Test Performed by:



   [facility] - [address] [address]

 

 7  *******Because ethnic data is not always readily available,



   this report includes an eGFR for both -Americans and



   non- Americans.****



   The National Kidney Disease Education Program (NKDEP) does



   not endorse the use of the MDRD equation for patients that



   are not between the ages of 18 and 70, are pregnant, have



   extremes of body size, muscle mass, or nutritional status,



   or are non- or non-.



   According to the National Kidney Foundation, irrespective of



   diagnosis, the stage of the disease is based on the level of



   kidney function:



   Stage Description                      GFR(mL/min/1.73 m(2))



   1     Kidney damage with normal or decreased GFR       90



   2     Kidney damage with mild decrease in GFR          60-89



   3     Moderate decrease in GFR                         30-59



   4     Severe decrease in GFR                           15-29



   5     Kidney failure                       &lt;15 (or dialysis)







Procedures







 Description

 

 No Information







Encounters







 Type  Date  Location  Provider  CPT E/M  Dx

 

 Office Visit  2018  [facility]  [place] Peres [place]  [address]  
R20.0



   10:15a        









 R20.2

 

 M79.2

 

 H53.8







Plan of Care

Future Appointment(s):10/16/2018  9:30 am - Sagar Peres MD at 
[id number] - Sagar Peres, MDR20.0 Anesthesia of 
skinR20.2 Paresthesia of skinH53.8 Other visual disturbancesComments:Her joint 
symtoms and rash do go along with connective tissue disease and she is being 
treated adn no specific abnormalities found on neurologic testing - will have 
her complete the donald now.  Will follow clinically and she will call if gets 
numbness, weakness or further change in vision.Will take vit d 2000 units a day 
since level on low sideFollow up:6 MONTHS

## 2018-06-30 ENCOUNTER — HOSPITAL ENCOUNTER (EMERGENCY)
Dept: HOSPITAL 25 - ED | Age: 18
Discharge: HOME | End: 2018-06-30
Payer: COMMERCIAL

## 2018-06-30 VITALS — SYSTOLIC BLOOD PRESSURE: 102 MMHG | DIASTOLIC BLOOD PRESSURE: 63 MMHG

## 2018-06-30 DIAGNOSIS — Z82.49: ICD-10-CM

## 2018-06-30 DIAGNOSIS — E86.0: Primary | ICD-10-CM

## 2018-06-30 DIAGNOSIS — R00.2: ICD-10-CM

## 2018-06-30 DIAGNOSIS — R11.2: ICD-10-CM

## 2018-06-30 LAB
BASOPHILS # BLD AUTO: 0 10^3/UL (ref 0–0.2)
EOSINOPHIL # BLD AUTO: 0.1 10^3/UL (ref 0–0.6)
HCT VFR BLD AUTO: 36 % (ref 35–47)
HGB BLD-MCNC: 12 G/DL (ref 12–16)
INR PPP/BLD: 0.9 (ref 0.77–1.02)
LYMPHOCYTES # BLD AUTO: 2.3 10^3/UL (ref 1–4.8)
MCH RBC QN AUTO: 30 PG (ref 27–31)
MCHC RBC AUTO-ENTMCNC: 34 G/DL (ref 31–36)
MCV RBC AUTO: 90 FL (ref 80–97)
MONOCYTES # BLD AUTO: 0.5 10^3/UL (ref 0–0.8)
NEUTROPHILS # BLD AUTO: 1.7 10^3/UL (ref 1.5–7.7)
NRBC # BLD AUTO: 0 10^3/UL
NRBC BLD QL AUTO: 0
PLATELET # BLD AUTO: 243 10^3/UL (ref 150–450)
RBC # BLD AUTO: 3.96 10^6/UL (ref 4–5.4)
WBC # BLD AUTO: 4.5 10^3/UL (ref 3.5–10.8)

## 2018-06-30 PROCEDURE — 81003 URINALYSIS AUTO W/O SCOPE: CPT

## 2018-06-30 PROCEDURE — 85730 THROMBOPLASTIN TIME PARTIAL: CPT

## 2018-06-30 PROCEDURE — 83690 ASSAY OF LIPASE: CPT

## 2018-06-30 PROCEDURE — 99283 EMERGENCY DEPT VISIT LOW MDM: CPT

## 2018-06-30 PROCEDURE — 85025 COMPLETE CBC W/AUTO DIFF WBC: CPT

## 2018-06-30 PROCEDURE — 86308 HETEROPHILE ANTIBODY SCREEN: CPT

## 2018-06-30 PROCEDURE — 84702 CHORIONIC GONADOTROPIN TEST: CPT

## 2018-06-30 PROCEDURE — 96360 HYDRATION IV INFUSION INIT: CPT

## 2018-06-30 PROCEDURE — 80053 COMPREHEN METABOLIC PANEL: CPT

## 2018-06-30 PROCEDURE — 86140 C-REACTIVE PROTEIN: CPT

## 2018-06-30 PROCEDURE — 84443 ASSAY THYROID STIM HORMONE: CPT

## 2018-06-30 PROCEDURE — 36415 COLL VENOUS BLD VENIPUNCTURE: CPT

## 2018-06-30 PROCEDURE — 84484 ASSAY OF TROPONIN QUANT: CPT

## 2018-06-30 PROCEDURE — 85610 PROTHROMBIN TIME: CPT

## 2018-06-30 PROCEDURE — 83735 ASSAY OF MAGNESIUM: CPT

## 2018-06-30 PROCEDURE — 93005 ELECTROCARDIOGRAM TRACING: CPT

## 2018-06-30 NOTE — ED
Morales NORMAN Tariq, scribed for Robb Davis MD on 06/30/18 at 1542 .





Complex/Multi-Sys Presentation





- HPI Summary


HPI Summary: 


A 16 y/o female presents to ED c/o N/V for the past day. Additionally c/o 

lightheadedness, ear pain and throat dryness. According to the pt, she 

exhibited N/V since yesterday morning which gets worse with movement of her 

head. She noted that she tried drinking a lot of water today to stay hydrated, 

however she has not been able to keep anything down. She has been vomiting for 

the past couple hours. Pt noted that she feels hot, shaky and has had "heart 

palpations". Pt denies abdominal pain, urinary issues, diarrhea/constipation 

and back pain, however she has a headache, lightheadedness and feels 

dehydrated. No current medications and no known allergies. Last menstrual 

period was last week. No major surgeries.





- History Of Current Complaint


Chief Complaint: EDNauseaVomitDiarrh


Time Seen by Provider: 06/30/18 15:26


Hx Obtained From: Patient


Onset/Duration: Sudden Onset, Lasting Days - 1 day, Still Present - Slight


Aggravating Factor(s): Movement of head


Associated Signs And Symptoms: Positive: Headache, Palpitations, Nausea, 

Vomiting.  Negative: Diarrhea, Back Pain





- Allergies/Home Medications


Allergies/Adverse Reactions: 


 Allergies











Allergy/AdvReac Type Severity Reaction Status Date / Time


 


No Known Allergies Allergy   Verified 06/30/18 15:39














PMH/Surg Hx/FS Hx/Imm Hx


Endocrine/Hematology History: 


   Denies: Hx Diabetes


Cardiovascular History: 


   Denies: Hx Hypertension, Hx Pacemaker/ICD


 History: 


   Denies: Hx Renal Disease


Sensory History: 


   Denies: Hx Hearing Aid


Psychiatric History: Reports: Hx Panic Disorder - ANXIETY





- Surgical History


Surgery Procedure, Year, and Place: DENIES


Infectious Disease History: No


Infectious Disease History: 


   Denies: Traveled Outside the [place] in Last 30 Days





- Family History


Known Family History: Positive: Cardiac Disease - father with CHF as older adult


   Negative: Hypertension, Diabetes





- Social History


Alcohol Use: None


Substance Use Type: Reports: None


Smoking Status (MU): Never Smoked Tobacco


Have You Smoked in the Last Year: No





Review of Systems


Negative: Fever


Positive: Sore Throat - Dryness, Ear Ache


Positive: Palpitations


Positive: Vomiting, Nausea.  Negative: Abdominal Pain, Diarrhea


Negative: no symptoms reported


Positive: Other - NEGATIVE: back pain


Neurological: Other - POSITIVE: lightheaded


All Other Systems Reviewed And Are Negative: Yes





Physical Exam





- Summary


Physical Exam Summary: 


General:well-appearing, no pain distress


Skin:warm, color reflects adequate perfusion, dry


Head:normal


Eyes:EOMI, GAY


ENT:Oral mucosa dry


Neck:supple, nontender


Respiratory:CTA, breath sounds present


Cardiovascular:RRR


Abdomen:soft, nontender


Bowel:present


Musculoskeletal:normal, strength/ROM intact


Neurological:sensory/motor intact, A&O x3


Psychological:affect/mood appropriate


Triage Information Reviewed: Yes


Vital Signs On Initial Exam: 


 Initial Vitals











Temp Pulse Resp BP Pulse Ox


 


 98.3 F   88   16   123/66   99 


 


 06/30/18 15:21  06/30/18 15:21  06/30/18 15:21  06/30/18 15:21  06/30/18 15:21











Vital Signs Reviewed: Yes





Diagnostics





- Vital Signs


 Vital Signs











  Temp Pulse Resp BP Pulse Ox


 


 06/30/18 15:37   77  17  123/76  100


 


 06/30/18 15:36    12  


 


 06/30/18 15:21  98.3 F  88  16  123/66  99














- Laboratory


Lab Results: 


 Lab Results











  06/30/18 06/30/18 06/30/18 Range/Units





  16:09 16:09 16:09 


 


WBC  4.5    (3.5-10.8)  10^3/ul


 


RBC  3.96 L    (4.00-5.40)  10^6/ul


 


Hgb  12.0    (12.0-16.0)  g/dl


 


Hct  36    (35-47)  %


 


MCV  90    (80-97)  fL


 


MCH  30    (27-31)  pg


 


MCHC  34    (31-36)  g/dl


 


RDW  14    (10.5-15)  %


 


Plt Count  243    (150-450)  10^3/ul


 


MPV  6.9 L    (7.4-10.4)  um3


 


Neut % (Auto)  37.4 L    (38-83)  %


 


Lymph % (Auto)  50.0 H    (25-47)  %


 


Mono % (Auto)  10.4 H    (0-7)  %


 


Eos % (Auto)  1.5    (0-6)  %


 


Baso % (Auto)  0.7    (0-2)  %


 


Absolute Neuts (auto)  1.7    (1.5-7.7)  10^3/ul


 


Absolute Lymphs (auto)  2.3    (1.0-4.8)  10^3/ul


 


Absolute Monos (auto)  0.5    (0-0.8)  10^3/ul


 


Absolute Eos (auto)  0.1    (0-0.6)  10^3/ul


 


Absolute Basos (auto)  0    (0-0.2)  10^3/ul


 


Absolute Nucleated RBC  0    10^3/ul


 


Nucleated RBC %  0    


 


INR (Anticoag Therapy)   0.90   (0.77-1.02)  


 


APTT   34.6   (26.0-36.3)  seconds


 


Sodium    138  (135-145)  mmol/L


 


Potassium    3.9  (3.5-5.0)  mmol/L


 


Chloride    103  (101-111)  mmol/L


 


Carbon Dioxide    31  (22-32)  mmol/L


 


Anion Gap    4  (2-11)  mmol/L


 


BUN    9  (6-24)  mg/dL


 


Creatinine    0.67  (0.51-0.95)  mg/dL


 


Est GFR ( Amer)    137.2  (>60)  


 


Est GFR (Non-Af Amer)    113.4  (>60)  


 


BUN/Creatinine Ratio    13.4  (8-20)  


 


Glucose    87  ()  mg/dL


 


Calcium    9.0  (8.6-10.3)  mg/dL


 


Magnesium    1.8 L  (1.9-2.7)  mg/dL


 


Total Bilirubin    0.40  (0.2-1.0)  mg/dL


 


AST    15  (13-39)  U/L


 


ALT    15  (7-52)  U/L


 


Alkaline Phosphatase    40  ()  U/L


 


Troponin I    0.00  (<0.04)  ng/mL


 


C-Reactive Protein    < 1.00  (<8.01)  mg/L


 


Total Protein    6.5  (6.4-8.9)  g/dL


 


Albumin    4.0  (3.2-5.2)  g/dL


 


Globulin    2.5  (2-4)  g/dL


 


Albumin/Globulin Ratio    1.6  (1-3)  


 


Lipase    12  (11.0-82.0)  U/L


 


TSH    3.91  (0.34-5.60)  mcIU/mL


 


Beta HCG, Quant    < 0.60  mIU/mL


 


Urine Color     


 


Urine Appearance     


 


Urine pH     (5-9)  


 


Ur Specific Gravity     (1.010-1.030)  


 


Urine Protein     (Negative)  


 


Urine Ketones     (Negative)  


 


Urine Blood     (Negative)  


 


Urine Nitrate     (Negative)  


 


Urine Bilirubin     (Negative)  


 


Urine Urobilinogen     (Negative)  


 


Ur Leukocyte Esterase     (Negative)  


 


Urine Glucose     (Negative)  


 


Monoscreen  Negative    (Negative)  














  06/30/18 Range/Units





  17:00 


 


WBC   (3.5-10.8)  10^3/ul


 


RBC   (4.00-5.40)  10^6/ul


 


Hgb   (12.0-16.0)  g/dl


 


Hct   (35-47)  %


 


MCV   (80-97)  fL


 


MCH   (27-31)  pg


 


MCHC   (31-36)  g/dl


 


RDW   (10.5-15)  %


 


Plt Count   (150-450)  10^3/ul


 


MPV   (7.4-10.4)  um3


 


Neut % (Auto)   (38-83)  %


 


Lymph % (Auto)   (25-47)  %


 


Mono % (Auto)   (0-7)  %


 


Eos % (Auto)   (0-6)  %


 


Baso % (Auto)   (0-2)  %


 


Absolute Neuts (auto)   (1.5-7.7)  10^3/ul


 


Absolute Lymphs (auto)   (1.0-4.8)  10^3/ul


 


Absolute Monos (auto)   (0-0.8)  10^3/ul


 


Absolute Eos (auto)   (0-0.6)  10^3/ul


 


Absolute Basos (auto)   (0-0.2)  10^3/ul


 


Absolute Nucleated RBC   10^3/ul


 


Nucleated RBC %   


 


INR (Anticoag Therapy)   (0.77-1.02)  


 


APTT   (26.0-36.3)  seconds


 


Sodium   (135-145)  mmol/L


 


Potassium   (3.5-5.0)  mmol/L


 


Chloride   (101-111)  mmol/L


 


Carbon Dioxide   (22-32)  mmol/L


 


Anion Gap   (2-11)  mmol/L


 


BUN   (6-24)  mg/dL


 


Creatinine   (0.51-0.95)  mg/dL


 


Est GFR ( Amer)   (>60)  


 


Est GFR (Non-Af Amer)   (>60)  


 


BUN/Creatinine Ratio   (8-20)  


 


Glucose   ()  mg/dL


 


Calcium   (8.6-10.3)  mg/dL


 


Magnesium   (1.9-2.7)  mg/dL


 


Total Bilirubin   (0.2-1.0)  mg/dL


 


AST   (13-39)  U/L


 


ALT   (7-52)  U/L


 


Alkaline Phosphatase   ()  U/L


 


Troponin I   (<0.04)  ng/mL


 


C-Reactive Protein   (<8.01)  mg/L


 


Total Protein   (6.4-8.9)  g/dL


 


Albumin   (3.2-5.2)  g/dL


 


Globulin   (2-4)  g/dL


 


Albumin/Globulin Ratio   (1-3)  


 


Lipase   (11.0-82.0)  U/L


 


TSH   (0.34-5.60)  mcIU/mL


 


Beta HCG, Quant   mIU/mL


 


Urine Color  Yellow  


 


Urine Appearance  Clear  


 


Urine pH  6.0  (5-9)  


 


Ur Specific Gravity  1.008 L  (1.010-1.030)  


 


Urine Protein  Negative  (Negative)  


 


Urine Ketones  Negative  (Negative)  


 


Urine Blood  Negative  (Negative)  


 


Urine Nitrate  Negative  (Negative)  


 


Urine Bilirubin  Negative  (Negative)  


 


Urine Urobilinogen  Negative  (Negative)  


 


Ur Leukocyte Esterase  Negative  (Negative)  


 


Urine Glucose  Negative  (Negative)  


 


Monoscreen   (Negative)  











Result Diagrams: 


 06/30/18 16:09





 06/30/18 16:09


Lab Statement: Any lab studies that have been ordered have been reviewed, and 

results considered in the medical decision making process.





- EKG


  ** 1539


Cardiac Rate: NL - 75 BPM


EKG Rhythm: Sinus Rhythm


ST Segment: Normal


Ectopy: None





Re-Evaluation





- Re-Evaluation


  ** First Eval


Re-Evaluation Time: 18:23


Change: Improved


Comment: Not nauseous anymore. Feeling better.





Complex Multi-Symp Course/Dx


Course Of Treatment: NAUSEA IMPROVED IN THE ED.  DISCUSSED RESULTS WITH THE 

PATIENT.  SHE HAS FOLLOW UP SCHEDULED WITH HER PMD THIS WEEK; RETURN TO ED IF 

WORSE.





- Diagnoses


Provider Diagnoses: 


 Nausea & vomiting, Dehydration, Palpitations








Discharge





- Sign-Out/Discharge


Documenting (check all that apply): Discharge/Admit/Transfer





- Discharge Plan


Condition: Stable


Disposition: HOME


Prescriptions: 


Ondansetron ODT TAB* [Zofran 4 MG Odt TAB*] 4 mg PO Q6H PRN #10 tab.odt


 PRN Reason: Nausea


Patient Education Materials:  Dehydration (ED), Acute Nausea and Vomiting (ED), 

Heart Palpitations (ED)


Referrals: 


Karen Gabriel [Nurse Practitioner] - 


Additional Instructions: 


FOLLOW UP WITH YOUR DOCTOR.


GET RECHECKED FOR ANY WORSENING OF YOUR CONDITION OR QUESTIONS OR CONCERNS.





- Billing Disposition and Condition


Condition: STABLE


Disposition: Home





The documentation as recorded by the Morales barry Tariq accurately reflects 

the service I personally performed and the decisions made by me, Robb Davis MD.

## 2018-09-26 ENCOUNTER — HOSPITAL ENCOUNTER (EMERGENCY)
Dept: HOSPITAL 25 - UCCORT | Age: 18
Discharge: HOME | End: 2018-09-26
Payer: COMMERCIAL

## 2018-09-26 VITALS — SYSTOLIC BLOOD PRESSURE: 118 MMHG | DIASTOLIC BLOOD PRESSURE: 77 MMHG

## 2018-09-26 DIAGNOSIS — M54.5: Primary | ICD-10-CM

## 2018-09-26 PROCEDURE — 72110 X-RAY EXAM L-2 SPINE 4/>VWS: CPT

## 2018-09-26 PROCEDURE — G0463 HOSPITAL OUTPT CLINIC VISIT: HCPCS

## 2018-09-26 PROCEDURE — 99212 OFFICE O/P EST SF 10 MIN: CPT

## 2018-09-26 NOTE — UC
Back Pain HPI





- HPI Summary


HPI Summary: 





17 year old female here with a complaint of low back pain.  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 

pain.  Pain is worse with range of motion.  No weakness or numbness.  Patient 

has not tried any ibuprofen or any pain medications.  No difficulty controlling 

urine or bowel.





- History of Current Complaint


Chief Complaint: UCBackPain


Stated Complaint: BACK PAIN W/C


Time Seen by Provider: 09/26/18 19:03


Hx Last Menstrual Period: 9/20/18


Pain Intensity: 10





- Allergies/Home Medications


Allergies/Adverse Reactions: 


 Allergies











Allergy/AdvReac Type Severity Reaction Status Date / Time


 


No Known Allergies Allergy   Verified 09/26/18 18:53














PMH/Surg Hx/FS Hx/Imm Hx





- Additional Past Medical History


Additional PMH: 





FIBROMYALGIA





- Surgical History


Surgical History: None


Surgery Procedure, Year, and Place: DENIES





- Family History


Known Family History: Positive: Cardiac Disease - father with CHF as older adult


   Negative: Hypertension, Diabetes





- Social History


Alcohol Use: None


Substance Use Type: None


Smoking Status (MU): Never Smoked Tobacco


Have You Smoked in the Last Year: No


Household Exposure Type: Cigarettes





Review of Systems


Constitutional: Negative


Skin: Negative


Eyes: Negative


ENT: Negative


Respiratory: Negative


Cardiovascular: Negative


Gastrointestinal: Negative


Motor: Negative


Neurovascular: Negative


Musculoskeletal: Other: - See history present illness


Neurological: Negative


Is Patient Immunocompromised?: No


All Other Systems Reviewed And Are Negative: Yes





Physical Exam


Triage Information Reviewed: Yes


Appearance: Well-Appearing, Well-Nourished, Pain Distress - Mild pain distress 

with movement of the back


Vital Signs: 


 Initial Vital Signs











Temp  98.8 F   09/26/18 18:54


 


Pulse  75   09/26/18 18:54


 


Resp  18   09/26/18 18:54


 


BP  118/77   09/26/18 18:54


 


Pulse Ox  100   09/26/18 18:54











Vital Signs Reviewed: Yes


Eye Exam: Normal


Eyes: Positive: Conjunctiva Clear


Neck exam: Normal


Neck: Positive: Supple


Respiratory: Positive: No respiratory distress


Musculoskeletal: Positive: Other: - Patient is tender in the lumbar spine 

primarily to the right side of the lumbar spine and also the midline from L1 

down to L5.  No tenderness into the buttocks over the sciatic distribution.  

Lower extremity strength 5 out of 5 in the low back pain is worse with right 

hip flexion.  Patellar reflexes normal bilaterally.  No sensation deficits.


Neurological Exam: Normal


Neurological: Positive: Alert


Psychological Exam: Normal


Psychological: Positive: Age Appropriate Behavior


Skin Exam: Normal





Back Pain Course/Dx





- Course


Course Of Treatment: I discussed the x-rays with the patient.  I do not see any 

fractures or any acute disease process.  The radiologist reading is pending.  

The plan is ibuprofen with Flexeril when necessary.  Follow-up with primary 

care doctor reevaluation sooner if condition worsens.





- Differential Dx/Diagnosis


Provider Diagnoses: Low back pain





Discharge





- Sign-Out/Discharge


Documenting (check all that apply): Patient Departure


All imaging exams completed and their final reports reviewed: No





- Discharge Plan


Condition: Stable


Disposition: HOME


Prescriptions: 


Cyclobenzaprine TAB* [Flexeril 10 MG TAB*] 10 mg PO TID PRN #15 tab MDD 3


 PRN Reason: Pain


Ibuprofen TAB* [Motrin TAB* 600 MG] 600 mg PO Q6H PRN #30 tab


 PRN Reason: Pain


Patient Education Materials:  Low Back Strain (ED), Lower Back Exercises (ED)


Referrals: 


[facility] [place] [facility] [Outside]


Additional Instructions: 


FOLLOW UP WITH YOUR DOCTOR IF NOT COMPLETELY IMPROVED.


GET RECHECKED FOR ANY WORSENING OF YOUR CONDITION; PAIN, WEAKNESS, NUMBNESS, 

DIFFICULTY CONTROLLING BOWEL OR BLADDER OR QUESTIONS OR CONCERNS.





- Billing Disposition and Condition


Condition: STABLE


Disposition: Home

## 2018-09-27 NOTE — RAD
INDICATION:  Low back pain.



COMPARISON:  There are no relevant prior studies available for comparison.



TECHNIQUE: 5 views of the lumbar spine were obtained including lateral, oblique, AP and a

coned-down lateral view of the lumbar sacral junction.



FINDINGS: The vertebra are in normal alignment. No fracture is seen.  



Disc spaces appear maintained.



IMPRESSION:  NEGATIVE EXAM.



R0

## 2018-09-27 NOTE — UC
- Progress Note


Progress Note: 





Patient Name:         FADI MIXON                                           

                        Medical Record#: [id number]


Ordering Physician: Robb Davis MD                                        

                        Acct.#: [id number]


:     2000         Age: 17   Sex: F                                   

                        Location: [facility]


Exam Date: 18                                                       

                        ADM Status: [facility] ER


Order Information:                         SP LUMBARSACRAL 4+ VWS


Accession Number:                          [id number]


CPT:                                       [id number]


INDICATION:  Low back pain.





COMPARISON:  There are no relevant prior studies available for comparison.





TECHNIQUE: 5 views of the lumbar spine were obtained including lateral, oblique

, AP and a


coned-down lateral view of the lumbar sacral junction.





FINDINGS: The vertebra are in normal alignment. No fracture is seen.  





Disc spaces appear maintained.





IMPRESSION:  NEGATIVE EXAM.





R0





____________________________________________________________


<Electronically signed by Kehinde Davis MD in OV>  18 07


Dictated By: Kehinde Davis MD


Dictated Date/Time: 18 07


Transcribed Date/Time: 18 0658


Copy to:











CC:No Primary Care Phys,NOPCP ; Robb Davis MD


Imaging - [facility]                                 Imaging [facility] 


[address]                                       [address] [place] [address]


ph ([phone])                                     ph ([phone])        

                                        ph ([phone]) 















































This report is only to be considered final once signed by the Provider(s) as 

displayed in the "<Electronically Signed by >" field (s). Absence of a 


signature indicates the report is in a draft status and still needs to be 

finalized. In the event this document was created by someone other than the 


signing Provider, the individual initiating the document will be listed in the 

"Entered by:" or "Dictated by:" fields.


                                                                 1 of 1








Discharge





- Sign-Out/Discharge


Documenting (check all that apply): Post-Discharge Follow Up


All imaging exams completed and their final reports reviewed: Yes





- Discharge Plan


Condition: Stable


Disposition: HOME


Prescriptions: 


Cyclobenzaprine TAB* [Flexeril 10 MG TAB*] 10 mg PO TID PRN #15 tab MDD 3


 PRN Reason: Pain


Ibuprofen TAB* [Motrin TAB* 600 MG] 600 mg PO Q6H PRN #30 tab


 PRN Reason: Pain


Patient Education Materials:  Low Back Strain (ED), Lower Back Exercises (ED)


Referrals: 


[facility] [Outside]


Additional Instructions: 


FOLLOW UP WITH YOUR DOCTOR IF NOT COMPLETELY IMPROVED.


GET RECHECKED FOR ANY WORSENING OF YOUR CONDITION; PAIN, WEAKNESS, NUMBNESS, 

DIFFICULTY CONTROLLING BOWEL OR BLADDER OR QUESTIONS OR CONCERNS.





- Billing Disposition and Condition


Condition: STABLE


Disposition: Home

## 2018-12-20 ENCOUNTER — HOSPITAL ENCOUNTER (EMERGENCY)
Age: 18
Discharge: HOME | End: 2018-12-20
Attending: EMERGENCY MEDICINE
Payer: SELF-PAY

## 2018-12-20 VITALS
BODY MASS INDEX: 34.55 KG/M2 | SYSTOLIC BLOOD PRESSURE: 119 MMHG | WEIGHT: 195 LBS | DIASTOLIC BLOOD PRESSURE: 70 MMHG | TEMPERATURE: 98.4 F | HEART RATE: 80 BPM | OXYGEN SATURATION: 99 % | HEIGHT: 63 IN | RESPIRATION RATE: 16 BRPM

## 2018-12-20 DIAGNOSIS — R10.31 ABDOMINAL PAIN, RIGHT LOWER QUADRANT: ICD-10-CM

## 2018-12-20 DIAGNOSIS — N89.8 VAGINAL DISCHARGE: Primary | ICD-10-CM

## 2018-12-20 LAB
ALBUMIN SERPL-MCNC: 4.1 G/DL (ref 3.5–5)
ALBUMIN/GLOB SERPL: 1.1 {RATIO}
ALP SERPL-CCNC: 64 U/L (ref 50–136)
ALT SERPL-CCNC: 19 U/L (ref 12–65)
ANION GAP SERPL CALC-SCNC: 7 MMOL/L
AST SERPL-CCNC: 18 U/L (ref 15–37)
BASOPHILS # BLD: 0 K/UL (ref 0–0.2)
BASOPHILS NFR BLD: 1 % (ref 0–2)
BILIRUB SERPL-MCNC: 0.6 MG/DL (ref 0.2–1.1)
BUN SERPL-MCNC: 7 MG/DL (ref 6–23)
CALCIUM SERPL-MCNC: 9.1 MG/DL (ref 8.3–10.4)
CHLORIDE SERPL-SCNC: 104 MMOL/L (ref 98–107)
CO2 SERPL-SCNC: 28 MMOL/L (ref 21–32)
CREAT SERPL-MCNC: 0.71 MG/DL (ref 0.6–1)
DIFFERENTIAL METHOD BLD: ABNORMAL
EOSINOPHIL # BLD: 0.3 K/UL (ref 0–0.8)
EOSINOPHIL NFR BLD: 5 % (ref 0.5–7.8)
ERYTHROCYTE [DISTWIDTH] IN BLOOD BY AUTOMATED COUNT: 12.4 % (ref 11.9–14.6)
GLOBULIN SER CALC-MCNC: 3.6 G/DL (ref 2.3–3.5)
GLUCOSE SERPL-MCNC: 84 MG/DL (ref 65–100)
HCG UR QL: NEGATIVE
HCT VFR BLD AUTO: 41.7 % (ref 35.8–46.3)
HGB BLD-MCNC: 13.8 G/DL (ref 11.7–15.4)
IMM GRANULOCYTES # BLD: 0 K/UL (ref 0–0.5)
IMM GRANULOCYTES NFR BLD AUTO: 0 % (ref 0–5)
LIPASE SERPL-CCNC: 94 U/L (ref 73–393)
LYMPHOCYTES # BLD: 2.1 K/UL (ref 0.5–4.6)
LYMPHOCYTES NFR BLD: 35 % (ref 13–44)
MCH RBC QN AUTO: 29.7 PG (ref 26.1–32.9)
MCHC RBC AUTO-ENTMCNC: 33.1 G/DL (ref 31.4–35)
MCV RBC AUTO: 89.7 FL (ref 79.6–97.8)
MONOCYTES # BLD: 0.5 K/UL (ref 0.1–1.3)
MONOCYTES NFR BLD: 9 % (ref 4–12)
NEUTS SEG # BLD: 3 K/UL (ref 1.7–8.2)
NEUTS SEG NFR BLD: 50 % (ref 43–78)
NRBC # BLD: 0 K/UL (ref 0–0.2)
PLATELET # BLD AUTO: 226 K/UL (ref 150–450)
PMV BLD AUTO: 8.7 FL (ref 9.4–12.3)
POTASSIUM SERPL-SCNC: 4.3 MMOL/L (ref 3.5–5.1)
PROT SERPL-MCNC: 7.7 G/DL
RBC # BLD AUTO: 4.65 M/UL (ref 4.05–5.2)
SERVICE CMNT-IMP: NORMAL
SODIUM SERPL-SCNC: 139 MMOL/L (ref 136–145)
WBC # BLD AUTO: 5.9 K/UL (ref 4.3–11.1)
WET PREP GENITAL: NORMAL
WET PREP GENITAL: NORMAL

## 2018-12-20 PROCEDURE — 81025 URINE PREGNANCY TEST: CPT

## 2018-12-20 PROCEDURE — 83690 ASSAY OF LIPASE: CPT

## 2018-12-20 PROCEDURE — 80053 COMPREHEN METABOLIC PANEL: CPT

## 2018-12-20 PROCEDURE — 85025 COMPLETE CBC W/AUTO DIFF WBC: CPT

## 2018-12-20 PROCEDURE — 74011250637 HC RX REV CODE- 250/637: Performed by: EMERGENCY MEDICINE

## 2018-12-20 PROCEDURE — 87210 SMEAR WET MOUNT SALINE/INK: CPT

## 2018-12-20 PROCEDURE — 87491 CHLMYD TRACH DNA AMP PROBE: CPT

## 2018-12-20 PROCEDURE — 99284 EMERGENCY DEPT VISIT MOD MDM: CPT | Performed by: EMERGENCY MEDICINE

## 2018-12-20 PROCEDURE — 74011250636 HC RX REV CODE- 250/636: Performed by: EMERGENCY MEDICINE

## 2018-12-20 PROCEDURE — 81003 URINALYSIS AUTO W/O SCOPE: CPT | Performed by: EMERGENCY MEDICINE

## 2018-12-20 PROCEDURE — 96372 THER/PROPH/DIAG INJ SC/IM: CPT | Performed by: EMERGENCY MEDICINE

## 2018-12-20 RX ORDER — AZITHROMYCIN 250 MG/1
1000 TABLET, FILM COATED ORAL
Status: COMPLETED | OUTPATIENT
Start: 2018-12-20 | End: 2018-12-20

## 2018-12-20 RX ADMIN — CEFTRIAXONE SODIUM 250 MG: 250 INJECTION, POWDER, FOR SOLUTION INTRAMUSCULAR; INTRAVENOUS at 17:41

## 2018-12-20 RX ADMIN — AZITHROMYCIN 1000 MG: 250 TABLET, FILM COATED ORAL at 17:41

## 2018-12-20 NOTE — DISCHARGE INSTRUCTIONS
Please call the women's clinic to set up for gynecologic follow-up. Return for any new or concerning symptoms.

## 2018-12-20 NOTE — ED NOTES
I have reviewed discharge instructions with the patient. The patient verbalized understanding. Patient left ED via Discharge Method: ambulatory to Home with mother. Opportunity for questions and clarification provided. Patient given 0 scripts. Cristiane Cary RN        To continue your aftercare when you leave the hospital, you may receive an automated call from our care team to check in on how you are doing. This is a free service and part of our promise to provide the best care and service to meet your aftercare needs.  If you have questions, or wish to unsubscribe from this service please call [phone]. Thank you for Choosing our [facility] Emergency Department.

## 2018-12-20 NOTE — ED PROVIDER NOTES
The patient is a 40-year-old female with history of lupus, pyelonephritis and ovarian cysts presenting with right lower quadrant abdominal pain. She reports pain comes and goes and has been present for approximately 3 weeks. She reports mild nausea but no vomiting. States subjective fevers at home. No urinary pain. Patient is also complaining of some vaginal discharge. She has had this for about 2 weeks. She questions whether or not her boyfriend has been faithful to her. The history is provided by the patient. No  was used. Abdominal Pain    Associated symptoms include nausea. Pertinent negatives include no fever, no vomiting, no dysuria, no headaches and no back pain. Past Medical History:   Diagnosis Date    Autoimmune disease ([facility] [address]     LUPUS       History reviewed. No pertinent surgical history. History reviewed. No pertinent family history. Social History     Socioeconomic History    Marital status: Not on file     Spouse name: Not on file    Number of children: Not on file    Years of education: Not on file    Highest education level: Not on file   Social Needs    Financial resource strain: Not on file    Food insecurity - worry: Not on file    Food insecurity - inability: Not on file    Transportation needs - medical: Not on file   [employer] needs - non-medical: Not on file   Occupational History    Not on file   Tobacco Use    Smoking status: Never Smoker    Smokeless tobacco: Never Used   Substance and Sexual Activity    Alcohol use: No     Frequency: Never    Drug use: No    Sexual activity: No   Other Topics Concern    Not on file   Social History Narrative    Not on file         ALLERGIES: Patient has no known allergies. Review of Systems   Constitutional: Negative for fatigue and fever. HENT: Negative for sore throat. Respiratory: Negative for cough, chest tightness and shortness of breath.     Cardiovascular: Negative for leg swelling. Gastrointestinal: Positive for abdominal pain and nausea. Negative for vomiting. Genitourinary: Positive for vaginal discharge. Negative for dysuria and vaginal pain. Musculoskeletal: Negative for back pain. Neurological: Negative for syncope and headaches. Psychiatric/Behavioral: Negative for confusion. Vitals:    12/20/18 1540   BP: 134/84   Pulse: 82   Resp: 18   Temp: 98.6 °F (37 °C)   SpO2: 99%   Weight: 88.5 kg (195 lb)   Height: 5' 3\" (1.6 m)            Physical Exam   Constitutional: She is oriented to person, place, and time. She appears well-developed and well-nourished. No distress. HENT:   Head: Normocephalic and atraumatic. Eyes: Conjunctivae and EOM are normal. Pupils are equal, round, and reactive to light. Neck: Normal range of motion. Neck supple. Cardiovascular: Normal rate, regular rhythm and normal heart sounds. Pulmonary/Chest: Effort normal and breath sounds normal. No respiratory distress. She has no wheezes. She has no rales. Abdominal: Soft. She exhibits no distension. There is no tenderness. There is no rebound. Minimal pain to palpation in the right lower quadrant. No guarding. No rebound. Musculoskeletal: Normal range of motion. She exhibits no edema or tenderness. Neurological: She is alert and oriented to person, place, and time. Skin: Skin is warm and dry. No rash noted. She is not diaphoretic. Psychiatric: She has a normal mood and affect. Her behavior is normal.   Nursing note and vitals reviewed. MDM  Number of Diagnoses or Management Options  Abdominal pain, right lower quadrant: new and requires workup  Vaginal discharge: new and requires workup  Diagnosis management comments: I discussed laboratory findings with patient. I discussed possible further imaging that she states pain has been ongoing off and on for about 3 weeks. This makes it less suspicious for appendicitis.   She is complaining of vaginal discharge and questions sexually transmitted infection. Will treat for possible STI. Patient comfortable with discharge. We'll provide referral to gynecology. Strict return precautions in regards to fever or increasing pain or vomiting. Discussed safe sex. Patient expresses understanding. Discharged in stable condition. Clair Gurrola MD; 12/20/2018 @5:29 PM Voice dictation software was used during the making of this note. This software is not perfect and grammatical and other typographical errors may be present.   This note has not been proofread for errors.  ===================================================================         Amount and/or Complexity of Data Reviewed  Clinical lab tests: ordered and reviewed (Results for orders placed or performed during the hospital encounter of 12/20/18  -WET PREP       Result                      Value             Ref Range           Special Requests:                                             NO SPECIAL REQUESTS       Wet prep                    NO WBC'S SEEN                         Wet prep                                                      NO YEAST,TRICHOMONAS OR CLUE CELLS NOTED  -CBC WITH AUTOMATED DIFF       Result                      Value             Ref Range           WBC                         5.9               4.3 - 11.1 K*       RBC                         4.65              4.05 - 5.2 M*       HGB                         13.8              11.7 - 15.4 *       HCT                         41.7              35.8 - 46.3 %       MCV                         89.7              79.6 - 97.8 *       MCH                         29.7              26.1 - 32.9 *       MCHC                        33.1              31.4 - 35.0 *       RDW                         12.4              11.9 - 14.6 %       PLATELET                    226               150 - 450 K/*       MPV                         8.7 (L)           9.4 - 12.3 FL       ABSOLUTE NRBC               0.00 0.0 - 0.2 K/*       DF                          AUTOMATED                             NEUTROPHILS                 50                43 - 78 %           LYMPHOCYTES                 35                13 - 44 %           MONOCYTES                   9                 4.0 - 12.0 %        EOSINOPHILS                 5                 0.5 - 7.8 %         BASOPHILS                   1                 0.0 - 2.0 %         IMMATURE GRANULOCYTES       0                 0.0 - 5.0 %         ABS. NEUTROPHILS            3.0               1.7 - 8.2 K/*       ABS. LYMPHOCYTES            2.1               0.5 - 4.6 K/*       ABS. MONOCYTES              0.5               0.1 - 1.3 K/*       ABS. EOSINOPHILS            0.3               0.0 - 0.8 K/*       ABS. BASOPHILS              0.0               0.0 - 0.2 K/*       ABS. IMM.  GRANS.            0.0               0.0 - 0.5 K/*  -LIPASE       Result                      Value             Ref Range           Lipase                      94                73 - 382 U/L   -METABOLIC PANEL, COMPREHENSIVE       Result                      Value             Ref Range           Sodium                      139               136 - 145 mm*       Potassium                   4.3               3.5 - 5.1 mm*       Chloride                    104               98 - 107 mmo*       CO2                         28                21 - 32 mmol*       Anion gap                   7                 mmol/L              Glucose                     84                65 - 100 mg/*       BUN                         7                 6 - 23 MG/DL        Creatinine                  0.71              0.6 - 1.0 MG*       GFR est AA                  >60               >60 ml/min/1*       GFR est non-AA              >60               ml/min/1.73m2       Calcium                     9.1               8.3 - 10.4 M*       Bilirubin, total            0.6               0.2 - 1.1 MG*       ALT (SGPT)                  19 12 - 65 U/L         AST (SGOT)                  18                15 - 37 U/L         Alk.  phosphatase            64                50 - 136 U/L        Protein, total              7.7               g/dL                Albumin                     4.1               3.5 - 5.0 g/*       Globulin                    3.6 (H)           2.3 - 3.5 g/*       A-G Ratio                   1.1                              -HCG URINE, QL. - POC       Result                      Value             Ref Range           Pregnancy test,urine (*     NEGATIVE          NEG            )  Review and summarize past medical records: yes  Independent visualization of images, tracings, or specimens: yes    Risk of Complications, Morbidity, and/or Mortality  Presenting problems: moderate  Diagnostic procedures: moderate  Management options: moderate    Patient Progress  Patient progress: stable         Procedures

## 2018-12-26 LAB
C TRACH RRNA SPEC QL NAA+PROBE: NEGATIVE
N GONORRHOEA RRNA SPEC QL NAA+PROBE: NEGATIVE
SPECIMEN SOURCE: NORMAL

## 2019-05-31 ENCOUNTER — HOSPITAL ENCOUNTER (EMERGENCY)
Age: 19
Discharge: HOME | End: 2019-05-31
Attending: EMERGENCY MEDICINE
Payer: SELF-PAY

## 2019-05-31 VITALS
OXYGEN SATURATION: 100 % | TEMPERATURE: 98.5 F | BODY MASS INDEX: 34.15 KG/M2 | WEIGHT: 200 LBS | HEIGHT: 64 IN | HEART RATE: 78 BPM | DIASTOLIC BLOOD PRESSURE: 72 MMHG | SYSTOLIC BLOOD PRESSURE: 114 MMHG | RESPIRATION RATE: 16 BRPM

## 2019-05-31 DIAGNOSIS — K29.90 GASTRITIS AND DUODENITIS: Primary | ICD-10-CM

## 2019-05-31 DIAGNOSIS — R51.9 NONINTRACTABLE HEADACHE, UNSPECIFIED CHRONICITY PATTERN, UNSPECIFIED HEADACHE TYPE: ICD-10-CM

## 2019-05-31 LAB
ALBUMIN SERPL-MCNC: 4.1 G/DL (ref 3.5–5)
ALBUMIN/GLOB SERPL: 1.1 {RATIO} (ref 1.2–3.5)
ALP SERPL-CCNC: 68 U/L (ref 50–130)
ALT SERPL-CCNC: 22 U/L (ref 12–65)
ANION GAP SERPL CALC-SCNC: 8 MMOL/L (ref 7–16)
AST SERPL-CCNC: 16 U/L (ref 15–37)
BASOPHILS # BLD: 0 K/UL (ref 0–0.2)
BASOPHILS NFR BLD: 1 % (ref 0–2)
BILIRUB SERPL-MCNC: 0.4 MG/DL (ref 0.2–1.1)
BUN SERPL-MCNC: 9 MG/DL (ref 6–23)
CALCIUM SERPL-MCNC: 9.4 MG/DL (ref 8.3–10.4)
CHLORIDE SERPL-SCNC: 104 MMOL/L (ref 98–107)
CO2 SERPL-SCNC: 28 MMOL/L (ref 21–32)
CREAT SERPL-MCNC: 0.75 MG/DL (ref 0.6–1)
DIFFERENTIAL METHOD BLD: ABNORMAL
EOSINOPHIL # BLD: 0.2 K/UL (ref 0–0.8)
EOSINOPHIL NFR BLD: 3 % (ref 0.5–7.8)
ERYTHROCYTE [DISTWIDTH] IN BLOOD BY AUTOMATED COUNT: 12.3 % (ref 11.9–14.6)
GLOBULIN SER CALC-MCNC: 3.9 G/DL (ref 2.3–3.5)
GLUCOSE SERPL-MCNC: 103 MG/DL (ref 65–100)
HCG UR QL: NEGATIVE
HCT VFR BLD AUTO: 41.8 % (ref 35.8–46.3)
HGB BLD-MCNC: 14.1 G/DL (ref 11.7–15.4)
IMM GRANULOCYTES # BLD AUTO: 0 K/UL (ref 0–0.5)
IMM GRANULOCYTES NFR BLD AUTO: 0 % (ref 0–5)
LIPASE SERPL-CCNC: 123 U/L (ref 73–393)
LYMPHOCYTES # BLD: 2.5 K/UL (ref 0.5–4.6)
LYMPHOCYTES NFR BLD: 48 % (ref 13–44)
MCH RBC QN AUTO: 29.6 PG (ref 26.1–32.9)
MCHC RBC AUTO-ENTMCNC: 33.7 G/DL (ref 31.4–35)
MCV RBC AUTO: 87.6 FL (ref 79.6–97.8)
MONOCYTES # BLD: 0.4 K/UL (ref 0.1–1.3)
MONOCYTES NFR BLD: 8 % (ref 4–12)
NEUTS SEG # BLD: 2.1 K/UL (ref 1.7–8.2)
NEUTS SEG NFR BLD: 40 % (ref 43–78)
NRBC # BLD: 0 K/UL (ref 0–0.2)
PLATELET # BLD AUTO: 292 K/UL (ref 150–450)
PMV BLD AUTO: 8.8 FL (ref 9.4–12.3)
POTASSIUM SERPL-SCNC: 3.8 MMOL/L (ref 3.5–5.1)
PROT SERPL-MCNC: 8 G/DL (ref 6.3–8.2)
RBC # BLD AUTO: 4.77 M/UL (ref 4.05–5.2)
SODIUM SERPL-SCNC: 140 MMOL/L (ref 136–145)
WBC # BLD AUTO: 5.3 K/UL (ref 4.3–11.1)

## 2019-05-31 PROCEDURE — 81025 URINE PREGNANCY TEST: CPT

## 2019-05-31 PROCEDURE — 80053 COMPREHEN METABOLIC PANEL: CPT

## 2019-05-31 PROCEDURE — 81003 URINALYSIS AUTO W/O SCOPE: CPT | Performed by: EMERGENCY MEDICINE

## 2019-05-31 PROCEDURE — 83690 ASSAY OF LIPASE: CPT

## 2019-05-31 PROCEDURE — 74011250637 HC RX REV CODE- 250/637: Performed by: EMERGENCY MEDICINE

## 2019-05-31 PROCEDURE — 96374 THER/PROPH/DIAG INJ IV PUSH: CPT | Performed by: EMERGENCY MEDICINE

## 2019-05-31 PROCEDURE — 99284 EMERGENCY DEPT VISIT MOD MDM: CPT | Performed by: EMERGENCY MEDICINE

## 2019-05-31 PROCEDURE — 74011250636 HC RX REV CODE- 250/636: Performed by: EMERGENCY MEDICINE

## 2019-05-31 PROCEDURE — 85025 COMPLETE CBC W/AUTO DIFF WBC: CPT

## 2019-05-31 RX ORDER — RANITIDINE 150 MG/1
150 TABLET, FILM COATED ORAL 2 TIMES DAILY
Qty: 60 TAB | Refills: 0 | Status: SHIPPED | OUTPATIENT
Start: 2019-05-31 | End: 2019-06-10

## 2019-05-31 RX ORDER — ONDANSETRON 2 MG/ML
4 INJECTION INTRAMUSCULAR; INTRAVENOUS
Status: COMPLETED | OUTPATIENT
Start: 2019-05-31 | End: 2019-05-31

## 2019-05-31 RX ORDER — ACETAMINOPHEN 500 MG
1000 TABLET ORAL ONCE
Status: COMPLETED | OUTPATIENT
Start: 2019-05-31 | End: 2019-05-31

## 2019-05-31 RX ORDER — ONDANSETRON 4 MG/1
4 TABLET, ORALLY DISINTEGRATING ORAL
Qty: 11 TAB | Refills: 1 | Status: SHIPPED | OUTPATIENT
Start: 2019-05-31 | End: 2019-11-14 | Stop reason: CLARIF

## 2019-05-31 RX ADMIN — SODIUM CHLORIDE 1000 ML: 900 INJECTION, SOLUTION INTRAVENOUS at 11:16

## 2019-05-31 RX ADMIN — ONDANSETRON 4 MG: 2 INJECTION INTRAMUSCULAR; INTRAVENOUS at 11:17

## 2019-05-31 RX ADMIN — ACETAMINOPHEN 1000 MG: 500 TABLET, FILM COATED ORAL at 11:54

## 2019-05-31 NOTE — ED NOTES
I have reviewed discharge instructions with the patient. The patient verbalized understanding. Patient left ED via Discharge Method: ambulatory to Home with boyfriend. Opportunity for questions and clarification provided. Patient given 2 scripts. To continue your aftercare when you leave the hospital, you may receive an automated call from our care team to check in on how you are doing. This is a free service and part of our promise to provide the best care and service to meet your aftercare needs.  If you have questions, or wish to unsubscribe from this service please call [phone]. Thank you for Choosing our [facility] Emergency Department.

## 2019-05-31 NOTE — DISCHARGE INSTRUCTIONS
Use the medications as prescribed. Take Tylenol or ibuprofen for pain. Follow-up with a primary care physician or return to the emergency department for any new or worsening symptoms.

## 2019-05-31 NOTE — LETTER
[facility] EMERGENCY DEPT 
[address] [place] [id number] 
[phone] Work/School Note Date: 5/31/2019 To Whom It May concern: 
 
Luis Daniel Rebolledo was seen and treated today in the emergency room by the following provider(s): 
Attending Provider: Lalo Matthews MD.   
 
Luis Daniel Rebolledo {Return to school/sport/work: 6/1/2019 Sincerely, Kerry Bolton MD

## 2019-05-31 NOTE — ED PROVIDER NOTES
Patient is a 23-year-old female comes to the ER this morning with multiple complaints. She states about a week ago she woke up with headache, nausea, dizziness. She has not felt well for several days. The past few days she has had epigastric abdominal pain is worse with eating. She is drinking fine. She denies any urinary symptoms. Just finished her menstrual cycle which was normal.    The history is provided by the patient. Nausea    This is a new problem. The current episode started more than 2 days ago. The problem has not changed since onset. The emesis has an appearance of stomach contents. There has been no fever. Associated symptoms include abdominal pain, headaches and headaches. Pertinent negatives include no chills, no fever, no diarrhea and no cough. The patient is not pregnant. Her pertinent negatives include no irritable bowel syndrome, no bowel resection and no recent abdominal surgery. Past Medical History:   Diagnosis Date    Autoimmune disease ([facility] [address]     LUPUS       No past surgical history on file. No family history on file.     Social History     Socioeconomic History    Marital status: SINGLE     Spouse name: Not on file    Number of children: Not on file    Years of education: Not on file    Highest education level: Not on file   Occupational History    Not on file   Social Needs    Financial resource strain: Not on file    Food insecurity:     Worry: Not on file     Inability: Not on file    Transportation needs:     Medical: Not on file     Non-medical: Not on file   Tobacco Use    Smoking status: Never Smoker    Smokeless tobacco: Never Used   Substance and Sexual Activity    Alcohol use: No     Frequency: Never    Drug use: No    Sexual activity: Never   Lifestyle    Physical activity:     Days per week: Not on file     Minutes per session: Not on file    Stress: Not on file   Relationships    Social connections:     Talks on phone: Not on file     Gets together: Not on file     Attends Alevism service: Not on file     Active member of club or organization: Not on file     Attends meetings of clubs or organizations: Not on file     Relationship status: Not on file    Intimate partner violence:     Fear of current or ex partner: Not on file     Emotionally abused: Not on file     Physically abused: Not on file     Forced sexual activity: Not on file   Other Topics Concern    Not on file   Social History Narrative    Not on file         ALLERGIES: Patient has no known allergies. Review of Systems   Constitutional: Positive for fatigue. Negative for chills and fever. HENT: Negative for congestion, rhinorrhea and sore throat. Eyes: Negative for pain, discharge and visual disturbance. Respiratory: Negative for cough and shortness of breath. Cardiovascular: Negative for chest pain and palpitations. Gastrointestinal: Positive for abdominal pain, nausea and vomiting. Negative for diarrhea. Endocrine: Negative for polydipsia and polyuria. Genitourinary: Negative for difficulty urinating, dysuria, frequency, urgency, vaginal bleeding and vaginal discharge. Musculoskeletal: Negative for back pain and neck pain. Skin: Negative for rash. Neurological: Positive for dizziness and headaches. Negative for seizures, syncope and weakness. Hematological: Negative. Vitals:    05/31/19 1000   BP: (!) 160/95   Pulse: 96   Resp: 15   Temp: 98.5 °F (36.9 °C)   SpO2: 99%   Weight: 90.7 kg (200 lb)   Height: 5' 4\" (1.626 m)            Physical Exam   Constitutional: She is oriented to person, place, and time. She appears well-developed and well-nourished. HENT:   Head: Normocephalic and atraumatic. Eyes: Pupils are equal, round, and reactive to light. Conjunctivae and EOM are normal.   Neck: Normal range of motion. Neck supple. Cardiovascular: Normal rate, regular rhythm and intact distal pulses.    Pulmonary/Chest: Effort normal and breath sounds normal.   Abdominal: Soft. There is tenderness. There is no rebound and no guarding. Minimal epigastric tenderness without peritoneal signs   Musculoskeletal: Normal range of motion. She exhibits no edema or deformity. Lymphadenopathy:     She has no cervical adenopathy. Neurological: She is alert and oriented to person, place, and time. She has normal strength. No cranial nerve deficit or sensory deficit. GCS eye subscore is 4. GCS verbal subscore is 5. GCS motor subscore is 6. Skin: Skin is warm and dry. No rash noted. Nursing note and vitals reviewed. MDM  Number of Diagnoses or Management Options  Diagnosis management comments: 11:51 AM  Blood work unremarkable. Urinalysis is negative. Feeling a bit better after the IV fluids. Advised to continue with Tylenol or ibuprofen for the headaches. I will prescribe some Zantac and Zofran for the mild gastritis. Voice dictation software was used during the making of this note. This software is not perfect and grammatical and other typographical errors may be present. This note has been proofread, but may still contain errors.   Kristan Majano MD; 5/31/2019 @11:51 AM   ===================================================================         Amount and/or Complexity of Data Reviewed  Clinical lab tests: ordered and reviewed    Risk of Complications, Morbidity, and/or Mortality  Presenting problems: low  Diagnostic procedures: low  Management options: low    Patient Progress  Patient progress: improved         Procedures

## 2019-11-14 ENCOUNTER — APPOINTMENT (OUTPATIENT)
Dept: GENERAL RADIOLOGY | Age: 19
End: 2019-11-14
Attending: EMERGENCY MEDICINE
Payer: MEDICAID

## 2019-11-14 ENCOUNTER — HOSPITAL ENCOUNTER (EMERGENCY)
Age: 19
Discharge: HOME | End: 2019-11-14
Attending: EMERGENCY MEDICINE
Payer: MEDICAID

## 2019-11-14 VITALS
SYSTOLIC BLOOD PRESSURE: 125 MMHG | HEART RATE: 71 BPM | HEIGHT: 63 IN | OXYGEN SATURATION: 100 % | DIASTOLIC BLOOD PRESSURE: 71 MMHG | TEMPERATURE: 98 F | WEIGHT: 190 LBS | RESPIRATION RATE: 16 BRPM | BODY MASS INDEX: 33.66 KG/M2

## 2019-11-14 DIAGNOSIS — R07.89 CHEST WALL PAIN: Primary | ICD-10-CM

## 2019-11-14 LAB
ATRIAL RATE: 68 BPM
CALCULATED P AXIS, ECG09: 68 DEGREES
CALCULATED R AXIS, ECG10: 75 DEGREES
CALCULATED T AXIS, ECG11: 72 DEGREES
DIAGNOSIS, 93000: NORMAL
HCG UR QL: NEGATIVE
P-R INTERVAL, ECG05: 152 MS
Q-T INTERVAL, ECG07: 386 MS
QRS DURATION, ECG06: 98 MS
QTC CALCULATION (BEZET), ECG08: 410 MS
VENTRICULAR RATE, ECG03: 68 BPM

## 2019-11-14 PROCEDURE — 71046 X-RAY EXAM CHEST 2 VIEWS: CPT

## 2019-11-14 PROCEDURE — 93005 ELECTROCARDIOGRAM TRACING: CPT | Performed by: EMERGENCY MEDICINE

## 2019-11-14 PROCEDURE — 81025 URINE PREGNANCY TEST: CPT

## 2019-11-14 PROCEDURE — 81003 URINALYSIS AUTO W/O SCOPE: CPT | Performed by: EMERGENCY MEDICINE

## 2019-11-14 PROCEDURE — 99284 EMERGENCY DEPT VISIT MOD MDM: CPT | Performed by: EMERGENCY MEDICINE

## 2019-11-14 NOTE — DISCHARGE INSTRUCTIONS
Patient Education      Alternate Motrin/ibuprofen 600 mg with Tylenol 500-650 mg every 4 hours for pain. Ice to the sore area for 15 minutes every 4 hours while awake for 3 to 5 days. Follow-up with your doctor in 10 to 14 days if not improving. Return if any new, worsening or concerning symptoms. Chest Pain: Care Instructions  Your Care Instructions    Chest pain is not always a sign that something is wrong with your heart or that you have another serious problem. The doctor thinks your chest pain is caused by strained muscles or ligaments, inflamed chest cartilage, or another problem in your chest, rather than by your heart. You may need more tests to find the cause of your chest pain. Follow-up care is a key part of your treatment and safety. Be sure to make and go to all appointments, and call your doctor if you are having problems. It's also a good idea to know your test results and keep a list of the medicines you take. How can you care for yourself at home? · Take pain medicines exactly as directed. ? If the doctor gave you a prescription medicine for pain, take it as prescribed. ? If you are not taking a prescription pain medicine, ask your doctor if you can take an over-the-counter medicine. · Rest and protect the sore area. · Stop, change, or take a break from any activity that may be causing your pain or soreness. · Put ice or a cold pack on the sore area for 10 to 20 minutes at a time. Try to do this every 1 to 2 hours for the next 3 days (when you are awake) or until the swelling goes down. Put a thin cloth between the ice and your skin. · After 2 or 3 days, apply a heating pad set on low or a warm cloth to the area that hurts. Some doctors suggest that you go back and forth between hot and cold. · Do not wrap or tape your ribs for support. This may cause you to take smaller breaths, which could increase your risk of lung problems.   · Mentholated creams such as Bengay or [employer] may soothe sore muscles. Follow the instructions on the package. · Follow your doctor's instructions for exercising. · Gentle stretching and massage may help you get better faster. Stretch slowly to the point just before pain begins, and hold the stretch for at least 15 to 30 seconds. Do this 3 or 4 times a day. Stretch just after you have applied heat. · As your pain gets better, slowly return to your normal activities. Any increased pain may be a sign that you need to rest a while longer. When should you call for help? Call 911 anytime you think you may need emergency care. For example, call if:    · You have chest pain or pressure. This may occur with:  ? Sweating. ? Shortness of breath. ? Nausea or vomiting. ? Pain that spreads from the chest to the neck, jaw, or one or both shoulders or arms. ? Dizziness or lightheadedness. ? A fast or uneven pulse. After calling 911, chew 1 adult-strength aspirin. Wait for an ambulance. Do not try to drive yourself.     · You have sudden chest pain and shortness of breath, or you cough up blood.    Call your doctor now or seek immediate medical care if:    · You have any trouble breathing.     · Your chest pain gets worse.     · Your chest pain occurs consistently with exercise and is relieved by rest.    Watch closely for changes in your health, and be sure to contact your doctor if:    · Your chest pain does not get better after 1 week. Where can you learn more? Go to http://nereida-waldemar.info/. Enter V293 in the search box to learn more about \"Musculoskeletal Chest Pain: Care Instructions. \"  Current as of: June 26, 2019  Content Version: 12.2  © 4255-3822 [employer]. Care instructions adapted under license by [employer] (which disclaims liability or warranty for this information).  If you have questions about a medical condition or this instruction, always ask your healthcare professional. Federica [employer] disclaims any warranty or liability for your use of this information.

## 2019-11-14 NOTE — ED NOTES
I have reviewed discharge instructions with the patient. The patient verbalized understanding. Patient left ED via Discharge Method: ambulatory to Home with (boyfriend). Opportunity for questions and clarification provided. Patient given 0 scripts. To continue your aftercare when you leave the hospital, you may receive an automated call from our care team to check in on how you are doing. This is a free service and part of our promise to provide the best care and service to meet your aftercare needs.  If you have questions, or wish to unsubscribe from this service please call [phone]. Thank you for Choosing our [facility] Emergency Department.

## 2019-11-14 NOTE — ED PROVIDER NOTES
The history is provided by the patient. Rib Pain   This is a new problem. The average episode lasts 1 week. The problem occurs intermittently. The current episode started more than 1 week ago. The problem has been gradually worsening. Associated symptoms include chest pain (left lateral). Pertinent negatives include no fever, no coryza, no rhinorrhea, no neck pain, no cough, no sputum production, no vomiting and no abdominal pain. She has tried nothing for the symptoms. Associated medical issues do not include asthma, PE, CAD, past MI or DVT. Past Medical History:   Diagnosis Date    Autoimmune disease ([facility] [address]     LUPUS       History reviewed. No pertinent surgical history. History reviewed. No pertinent family history.     Social History     Socioeconomic History    Marital status: SINGLE     Spouse name: Not on file    Number of children: Not on file    Years of education: Not on file    Highest education level: Not on file   Occupational History    Not on file   Social Needs    Financial resource strain: Not on file    Food insecurity:     Worry: Not on file     Inability: Not on file    Transportation needs:     Medical: Not on file     Non-medical: Not on file   Tobacco Use    Smoking status: Never Smoker    Smokeless tobacco: Never Used   Substance and Sexual Activity    Alcohol use: No     Frequency: Never    Drug use: No    Sexual activity: Never   Lifestyle    Physical activity:     Days per week: Not on file     Minutes per session: Not on file    Stress: Not on file   Relationships    Social connections:     Talks on phone: Not on file     Gets together: Not on file     Attends Yazidi service: Not on file     Active member of club or organization: Not on file     Attends meetings of clubs or organizations: Not on file     Relationship status: Not on file    Intimate partner violence:     Fear of current or ex partner: Not on file     Emotionally abused: Not on file Physically abused: Not on file     Forced sexual activity: Not on file   Other Topics Concern    Not on file   Social History Narrative    Not on file         ALLERGIES: Patient has no known allergies. Review of Systems   Constitutional: Negative for fever. HENT: Negative for congestion and rhinorrhea. Respiratory: Negative for cough, sputum production and shortness of breath. Cardiovascular: Positive for chest pain (left lateral). Gastrointestinal: Negative for abdominal pain and vomiting. Genitourinary: Negative for dysuria and hematuria. Musculoskeletal: Negative for back pain and neck pain. Vitals:    11/14/19 1021 11/14/19 1137   BP: 121/79    Pulse: 75    Resp: 16    Temp: 98.2 °F (36.8 °C)    SpO2: 100% 100%   Weight: 86.2 kg (190 lb)    Height: 5' 3\" (1.6 m)             Physical Exam   Constitutional: She appears well-developed and well-nourished. No distress. HENT:   Head: Normocephalic. Mouth/Throat: Oropharynx is clear and moist.   Eyes: Pupils are equal, round, and reactive to light. Cardiovascular: Normal rate, regular rhythm and normal heart sounds. Pulmonary/Chest: Effort normal and breath sounds normal. She exhibits tenderness. Abdominal: Soft. She exhibits no distension and no mass. There is no tenderness. There is no rebound and no guarding. Musculoskeletal: Normal range of motion. She exhibits no edema or tenderness. Lymphadenopathy:     She has no cervical adenopathy. Neurological: She is alert. Skin: Skin is warm and dry. Nursing note and vitals reviewed. MDM  Number of Diagnoses or Management Options  Diagnosis management comments: Age > 48: NO  HR > 100: NO  O2 Sat on Room Air < 95%:  NO  Prior History of DVT/PE:NO  Recent Trauma or Surgery: NO  Hemoptysis: NO  Exogenous Estrogen: NO  Unilateral Leg Swelling: NO    If all questions are answered no then pt is PERC negative and is very low risk for PE.  No further testing, or imaging is indicated. iLang Knight MD 11:44 AM 11/14/2019  ===================================================================  Urine dip negative for blood or infection. Doubt renal colic. Amount and/or Complexity of Data Reviewed  Tests in the radiology section of CPT®: ordered and reviewed (Xr Chest Pa Lat    Result Date: 11/14/2019  EXAM: 2 view chest radiograph. INDICATION: 19 years left anterolateral chest wall pain/chest pain COMPARISON: None. FINDINGS: No focal lung consolidation. No pneumothorax. No pleural effusion. The heart, margy, and pulmonary vasculature are within normal limits. No evidence of acute osseous abnormality. IMPRESSION: 1. No acute cardiopulmonary abnormality.     )  Independent visualization of images, tracings, or specimens: yes (EKG shows an incomplete right bundle branch block but no ischemia. Normal sinus rhythm.   Read by ED physician.)           Procedures

## 2019-11-14 NOTE — ED TRIAGE NOTES
Pt states she has been having pain in left rib for about one week and now there is a small knot and tenderness under left breast since yesterday. Denies SOB.

## 2019-11-19 ENCOUNTER — HOSPITAL ENCOUNTER (EMERGENCY)
Age: 19
Discharge: LEFT BEFORE DISCHARGE | End: 2019-11-19
Attending: EMERGENCY MEDICINE
Payer: SELF-PAY

## 2019-11-19 VITALS
HEIGHT: 68 IN | HEART RATE: 95 BPM | BODY MASS INDEX: 28.79 KG/M2 | OXYGEN SATURATION: 100 % | WEIGHT: 190 LBS | RESPIRATION RATE: 16 BRPM | DIASTOLIC BLOOD PRESSURE: 92 MMHG | SYSTOLIC BLOOD PRESSURE: 144 MMHG | TEMPERATURE: 98.1 F

## 2019-11-19 PROCEDURE — 75810000275 HC EMERGENCY DEPT VISIT NO LEVEL OF CARE: Performed by: EMERGENCY MEDICINE

## 2019-11-19 NOTE — ED TRIAGE NOTES
Pt in c/o lower back pain and abdominal pain at umbilicus with nausea since yesterday. Denies vomiting fever.

## 2019-12-04 ENCOUNTER — APPOINTMENT (OUTPATIENT)
Dept: CT IMAGING | Age: 19
End: 2019-12-04
Attending: EMERGENCY MEDICINE
Payer: MEDICAID

## 2019-12-04 ENCOUNTER — HOSPITAL ENCOUNTER (EMERGENCY)
Age: 19
Discharge: HOME | End: 2019-12-05
Attending: EMERGENCY MEDICINE
Payer: MEDICAID

## 2019-12-04 DIAGNOSIS — R10.84 ABDOMINAL PAIN, GENERALIZED: Primary | ICD-10-CM

## 2019-12-04 LAB
ALBUMIN SERPL-MCNC: 4.3 G/DL (ref 3.5–5)
ALBUMIN/GLOB SERPL: 1.2 {RATIO} (ref 1.2–3.5)
ALP SERPL-CCNC: 69 U/L (ref 50–130)
ALT SERPL-CCNC: 21 U/L (ref 12–65)
ANION GAP SERPL CALC-SCNC: 4 MMOL/L (ref 7–16)
AST SERPL-CCNC: 14 U/L (ref 15–37)
BACTERIA URNS QL MICRO: 0 /HPF
BASOPHILS # BLD: 0 K/UL (ref 0–0.2)
BASOPHILS NFR BLD: 1 % (ref 0–2)
BILIRUB SERPL-MCNC: 0.4 MG/DL (ref 0.2–1.1)
BUN SERPL-MCNC: 7 MG/DL (ref 6–23)
CALCIUM SERPL-MCNC: 9.4 MG/DL (ref 8.3–10.4)
CASTS URNS QL MICRO: NORMAL /LPF
CHLORIDE SERPL-SCNC: 105 MMOL/L (ref 98–107)
CO2 SERPL-SCNC: 28 MMOL/L (ref 21–32)
CREAT SERPL-MCNC: 0.69 MG/DL (ref 0.6–1)
DIFFERENTIAL METHOD BLD: ABNORMAL
EOSINOPHIL # BLD: 0.1 K/UL (ref 0–0.8)
EOSINOPHIL NFR BLD: 2 % (ref 0.5–7.8)
EPI CELLS #/AREA URNS HPF: NORMAL /HPF
ERYTHROCYTE [DISTWIDTH] IN BLOOD BY AUTOMATED COUNT: 12.6 % (ref 11.9–14.6)
GLOBULIN SER CALC-MCNC: 3.7 G/DL (ref 2.3–3.5)
GLUCOSE SERPL-MCNC: 89 MG/DL (ref 65–100)
HCG UR QL: NEGATIVE
HCT VFR BLD AUTO: 40.2 % (ref 35.8–46.3)
HGB BLD-MCNC: 13.3 G/DL (ref 11.7–15.4)
IMM GRANULOCYTES # BLD AUTO: 0 K/UL (ref 0–0.5)
IMM GRANULOCYTES NFR BLD AUTO: 0 % (ref 0–5)
LYMPHOCYTES # BLD: 2.7 K/UL (ref 0.5–4.6)
LYMPHOCYTES NFR BLD: 47 % (ref 13–44)
MCH RBC QN AUTO: 29.4 PG (ref 26.1–32.9)
MCHC RBC AUTO-ENTMCNC: 33.1 G/DL (ref 31.4–35)
MCV RBC AUTO: 88.7 FL (ref 79.6–97.8)
MONOCYTES # BLD: 0.5 K/UL (ref 0.1–1.3)
MONOCYTES NFR BLD: 9 % (ref 4–12)
NEUTS SEG # BLD: 2.4 K/UL (ref 1.7–8.2)
NEUTS SEG NFR BLD: 42 % (ref 43–78)
NRBC # BLD: 0 K/UL (ref 0–0.2)
PLATELET # BLD AUTO: 280 K/UL (ref 150–450)
PMV BLD AUTO: 8.8 FL (ref 9.4–12.3)
POTASSIUM SERPL-SCNC: 3.1 MMOL/L (ref 3.5–5.1)
PROT SERPL-MCNC: 8 G/DL (ref 6.3–8.2)
RBC # BLD AUTO: 4.53 M/UL (ref 4.05–5.2)
RBC #/AREA URNS HPF: NORMAL /HPF
SERVICE CMNT-IMP: NORMAL
SODIUM SERPL-SCNC: 137 MMOL/L (ref 136–145)
WBC # BLD AUTO: 5.8 K/UL (ref 4.3–11.1)
WBC URNS QL MICRO: NORMAL /HPF
WET PREP GENITAL: NORMAL
WET PREP GENITAL: NORMAL

## 2019-12-04 PROCEDURE — 74174 CTA ABD&PLVS W/CONTRAST: CPT

## 2019-12-04 PROCEDURE — 74011000258 HC RX REV CODE- 258: Performed by: EMERGENCY MEDICINE

## 2019-12-04 PROCEDURE — 81003 URINALYSIS AUTO W/O SCOPE: CPT | Performed by: EMERGENCY MEDICINE

## 2019-12-04 PROCEDURE — 99285 EMERGENCY DEPT VISIT HI MDM: CPT | Performed by: EMERGENCY MEDICINE

## 2019-12-04 PROCEDURE — 81025 URINE PREGNANCY TEST: CPT

## 2019-12-04 PROCEDURE — 74011636320 HC RX REV CODE- 636/320: Performed by: EMERGENCY MEDICINE

## 2019-12-04 PROCEDURE — 87491 CHLMYD TRACH DNA AMP PROBE: CPT

## 2019-12-04 PROCEDURE — 96360 HYDRATION IV INFUSION INIT: CPT | Performed by: EMERGENCY MEDICINE

## 2019-12-04 PROCEDURE — 74011250636 HC RX REV CODE- 250/636: Performed by: EMERGENCY MEDICINE

## 2019-12-04 PROCEDURE — 80053 COMPREHEN METABOLIC PANEL: CPT

## 2019-12-04 PROCEDURE — 81015 MICROSCOPIC EXAM OF URINE: CPT

## 2019-12-04 PROCEDURE — 85025 COMPLETE CBC W/AUTO DIFF WBC: CPT

## 2019-12-04 PROCEDURE — 87210 SMEAR WET MOUNT SALINE/INK: CPT

## 2019-12-04 RX ORDER — SODIUM CHLORIDE 9 MG/ML
1000 INJECTION, SOLUTION INTRAVENOUS ONCE
Status: COMPLETED | OUTPATIENT
Start: 2019-12-04 | End: 2019-12-04

## 2019-12-04 RX ORDER — SODIUM CHLORIDE 0.9 % (FLUSH) 0.9 %
10 SYRINGE (ML) INJECTION
Status: COMPLETED | OUTPATIENT
Start: 2019-12-04 | End: 2019-12-04

## 2019-12-04 RX ADMIN — Medication 10 ML: at 21:49

## 2019-12-04 RX ADMIN — SODIUM CHLORIDE 1000 ML: 900 INJECTION, SOLUTION INTRAVENOUS at 22:05

## 2019-12-04 RX ADMIN — SODIUM CHLORIDE 100 ML: 900 INJECTION, SOLUTION INTRAVENOUS at 21:49

## 2019-12-04 RX ADMIN — IOPAMIDOL 100 ML: 755 INJECTION, SOLUTION INTRAVENOUS at 21:49

## 2019-12-05 VITALS
BODY MASS INDEX: 28.89 KG/M2 | DIASTOLIC BLOOD PRESSURE: 66 MMHG | HEART RATE: 74 BPM | WEIGHT: 190 LBS | SYSTOLIC BLOOD PRESSURE: 129 MMHG | RESPIRATION RATE: 16 BRPM | OXYGEN SATURATION: 98 % | TEMPERATURE: 97.8 F

## 2019-12-05 NOTE — ED NOTES
I have reviewed discharge instructions with the patient. The patient verbalized understanding. Patient left ED via Discharge Method: ambulatory to Home with friend. Opportunity for questions and clarification provided. Patient given 0 scripts. To continue your aftercare when you leave the hospital, you may receive an automated call from our care team to check in on how you are doing. This is a free service and part of our promise to provide the best care and service to meet your aftercare needs.  If you have questions, or wish to unsubscribe from this service please call [phone]. Thank you for Choosing our [facility] Emergency Department.

## 2019-12-05 NOTE — DISCHARGE INSTRUCTIONS
Patient Education   Tylenol and ibuprofen for pain. Return if any worsening pain or fever. Call your primary care doctor tomorrow for follow-up appointment this week or early next week for recheck. Diet as tolerated. Abdominal Pain: Care Instructions  Your Care Instructions    Abdominal pain has many possible causes. Some aren't serious and get better on their own in a few days. Others need more testing and treatment. If your pain continues or gets worse, you need to be rechecked and may need more tests to find out what is wrong. You may need surgery to correct the problem. Don't ignore new symptoms, such as fever, nausea and vomiting, urination problems, pain that gets worse, and dizziness. These may be signs of a more serious problem. Your doctor may have recommended a follow-up visit in the next 8 to 12 hours. If you are not getting better, you may need more tests or treatment. The doctor has checked you carefully, but problems can develop later. If you notice any problems or new symptoms, get medical treatment right away. Follow-up care is a key part of your treatment and safety. Be sure to make and go to all appointments, and call your doctor if you are having problems. It's also a good idea to know your test results and keep a list of the medicines you take. How can you care for yourself at home? · Rest until you feel better. · To prevent dehydration, drink plenty of fluids, enough so that your urine is light yellow or clear like water. Choose water and other caffeine-free clear liquids until you feel better. If you have kidney, heart, or liver disease and have to limit fluids, talk with your doctor before you increase the amount of fluids you drink. · If your stomach is upset, eat mild foods, such as rice, dry toast or crackers, bananas, and applesauce. Try eating several small meals instead of two or three large ones.   · Wait until 48 hours after all symptoms have gone away before you have spicy foods, alcohol, and drinks that contain caffeine. · Do not eat foods that are high in fat. · Avoid anti-inflammatory medicines such as aspirin, ibuprofen (Advil, Motrin), and naproxen (Aleve). These can cause stomach upset. Talk to your doctor if you take daily aspirin for another health problem. When should you call for help? Call 911 anytime you think you may need emergency care. For example, call if:    · You passed out (lost consciousness).     · You pass maroon or very bloody stools.     · You vomit blood or what looks like coffee grounds.     · You have new, severe belly pain.    Call your doctor now or seek immediate medical care if:    · Your pain gets worse, especially if it becomes focused in one area of your belly.     · You have a new or higher fever.     · Your stools are black and look like tar, or they have streaks of blood.     · You have unexpected vaginal bleeding.     · You have symptoms of a urinary tract infection. These may include:  ? Pain when you urinate. ? Urinating more often than usual.  ? Blood in your urine.     · You are dizzy or lightheaded, or you feel like you may faint.    Watch closely for changes in your health, and be sure to contact your doctor if:    · You are not getting better after 1 day (24 hours). Where can you learn more? Go to http://nereida-waldemar.info/. Enter R445 in the search box to learn more about \"Abdominal Pain: Care Instructions. \"  Current as of: June 26, 2019  Content Version: 12.2  © 0623-4159 [employer]. Care instructions adapted under license by [employer] (which disclaims liability or warranty for this information). If you have questions about a medical condition or this instruction, always ask your healthcare professional. [address] any warranty or liability for your use of this information.

## 2019-12-05 NOTE — ED PROVIDER NOTES
Patient recently diagnosed with a connective tissue disorder and wants to have an outpatient ultrasound to evaluate for aortic aneurysm but presents with worsening pain and a throbbing sensation in her back with lightheadedness and dizziness today. The history is provided by the patient. Abdominal Pain    This is a recurrent problem. The current episode started more than 1 week ago. The problem occurs daily. The problem has been gradually worsening. Associated with: Sensation in her back and intermittent sharp lower abdominal pains, today with lightheadedness and dizziness. The pain is located in the RLQ and suprapubic region. The quality of the pain is sharp and throbbing. The pain is moderate. Associated symptoms include back pain. Pertinent negatives include no fever, no diarrhea, no melena, no nausea, no vomiting, no dysuria, no frequency, no hematuria, no headaches, no myalgias and no chest pain. Nothing worsens the pain. The pain is relieved by nothing. Back Pain    Associated symptoms include abdominal pain. Pertinent negatives include no chest pain, no fever, no numbness, no headaches, no dysuria and no weakness. Dizziness   Pertinent negatives include no shortness of breath, no chest pain, no vomiting, no headaches and no nausea. Past Medical History:   Diagnosis Date    Autoimmune disease ([address]     LUPUS       No past surgical history on file. No family history on file.     Social History     Socioeconomic History    Marital status: SINGLE     Spouse name: Not on file    Number of children: Not on file    Years of education: Not on file    Highest education level: Not on file   Occupational History    Not on file   Social Needs    Financial resource strain: Not on file    Food insecurity:     Worry: Not on file     Inability: Not on file    Transportation needs:     Medical: Not on file     Non-medical: Not on file   Tobacco Use    Smoking status: Never Smoker    Smokeless tobacco: Never Used   Substance and Sexual Activity    Alcohol use: No     Frequency: Never    Drug use: No    Sexual activity: Never   Lifestyle    Physical activity:     Days per week: Not on file     Minutes per session: Not on file    Stress: Not on file   Relationships    Social connections:     Talks on phone: Not on file     Gets together: Not on file     Attends Sabianism service: Not on file     Active member of club or organization: Not on file     Attends meetings of clubs or organizations: Not on file     Relationship status: Not on file    Intimate partner violence:     Fear of current or ex partner: Not on file     Emotionally abused: Not on file     Physically abused: Not on file     Forced sexual activity: Not on file   Other Topics Concern    Not on file   Social History Narrative    Not on file         ALLERGIES: Patient has no known allergies. Review of Systems   Constitutional: Negative for chills and fever. HENT: Negative for congestion, rhinorrhea and sore throat. Eyes: Negative for photophobia and redness. Respiratory: Negative for cough and shortness of breath. Cardiovascular: Negative for chest pain and leg swelling. Gastrointestinal: Positive for abdominal pain. Negative for blood in stool, diarrhea, melena, nausea and vomiting. Endocrine: Negative for polydipsia and polyuria. Genitourinary: Negative for dysuria, frequency and hematuria. Musculoskeletal: Positive for back pain. Negative for myalgias. Neurological: Positive for dizziness and light-headedness. Negative for weakness, numbness and headaches. Vitals:    12/04/19 1955 12/04/19 2045   BP: 119/79    Pulse: 85    Resp: 18    Temp: 97.8 °F (36.6 °C)    SpO2: 98% 98%   Weight: 86.2 kg (190 lb)             Physical Exam  Vitals signs and nursing note reviewed. Constitutional:       General: She is not in acute distress. Appearance: She is well-developed. HENT:      Head: Normocephalic. Eyes:      Pupils: Pupils are equal, round, and reactive to light. Cardiovascular:      Rate and Rhythm: Normal rate and regular rhythm. Heart sounds: Normal heart sounds. Pulmonary:      Effort: Pulmonary effort is normal.      Breath sounds: Normal breath sounds. Abdominal:      General: There is no distension. Palpations: Abdomen is soft. There is pulsatile mass. There is no mass. Tenderness: There is no tenderness. There is no guarding or rebound. Genitourinary:     Vagina: Vaginal discharge present. Cervix: No cervical motion tenderness, discharge or friability. Uterus: Normal.       Adnexa: Right adnexa normal and left adnexa normal.   Musculoskeletal: Normal range of motion. Lymphadenopathy:      Cervical: No cervical adenopathy. Skin:     General: Skin is warm and dry. Neurological:      Mental Status: She is alert. MDM  Number of Diagnoses or Management Options  Diagnosis management comments: CT to exclude aortic aneurysm ruptured or otherwise. Also to evaluate for appendicitis. Pelvic exam to exclude PID. 11:41 PM  Pelvic exam unremarkable other than a white vaginal discharge. Wet prep pending. CT was negative for acute pathology aortic aneurysm or otherwise. Discharged home with abdominal pain instructions and continued outpatient management by PCP. Return if worsening pain or fever.        Amount and/or Complexity of Data Reviewed  Clinical lab tests: ordered and reviewed (Results for orders placed or performed during the hospital encounter of 12/04/19  -WET PREP       Result                      Value             Ref Range           Special Requests:                                             NO SPECIAL REQUESTS       Wet prep                                                      3 TO 5 WBC/HPF       Wet prep                                                      NO YEAST,TRICHOMONAS OR CLUE CELLS NOTED  -CBC WITH AUTOMATED DIFF       Result Value             Ref Range           WBC                         5.8               4.3 - 11.1 K*       RBC                         4.53              4.05 - 5.2 M*       HGB                         13.3              11.7 - 15.4 *       HCT                         40.2              35.8 - 46.3 %       MCV                         88.7              79.6 - 97.8 *       MCH                         29.4              26.1 - 32.9 *       MCHC                        33.1              31.4 - 35.0 *       RDW                         12.6              11.9 - 14.6 %       PLATELET                    280               150 - 450 K/*       MPV                         8.8 (L)           9.4 - 12.3 FL       ABSOLUTE NRBC               0.00              0.0 - 0.2 K/*       DF                          AUTOMATED                             NEUTROPHILS                 42 (L)            43 - 78 %           LYMPHOCYTES                 47 (H)            13 - 44 %           MONOCYTES                   9                 4.0 - 12.0 %        EOSINOPHILS                 2                 0.5 - 7.8 %         BASOPHILS                   1                 0.0 - 2.0 %         IMMATURE GRANULOCYTES       0                 0.0 - 5.0 %         ABS. NEUTROPHILS            2.4               1.7 - 8.2 K/*       ABS. LYMPHOCYTES            2.7               0.5 - 4.6 K/*       ABS. MONOCYTES              0.5               0.1 - 1.3 K/*       ABS. EOSINOPHILS            0.1               0.0 - 0.8 K/*       ABS. BASOPHILS              0.0               0.0 - 0.2 K/*       ABS. IMM.  GRANS.            0.0               0.0 - 0.5 K/*  -METABOLIC PANEL, COMPREHENSIVE       Result                      Value             Ref Range           Sodium                      137               136 - 145 mm*       Potassium                   3.1 (L)           3.5 - 5.1 mm*       Chloride                    105               98 - 107 mmo*       CO2 28                21 - 32 mmol*       Anion gap                   4 (L)             7 - 16 mmol/L       Glucose                     89                65 - 100 mg/*       BUN                         7                 6 - 23 MG/DL        Creatinine                  0.69              0.6 - 1.0 MG*       GFR est AA                  >60               >60 ml/min/1*       GFR est non-AA              >60               >60 ml/min/1*       Calcium                     9.4               8.3 - 10.4 M*       Bilirubin, total            0.4               0.2 - 1.1 MG*       ALT (SGPT)                  21                12 - 65 U/L         AST (SGOT)                  14 (L)            15 - 37 U/L         Alk. phosphatase            69                50 - 130 U/L        Protein, total              8.0               6.3 - 8.2 g/*       Albumin                     4.3               3.5 - 5.0 g/*       Globulin                    3.7 (H)           2.3 - 3.5 g/*       A-G Ratio                   1.2               1.2 - 3.5      -URINE MICROSCOPIC       Result                      Value             Ref Range           WBC                         5-10              0 /hpf              RBC                         0-3               0 /hpf              Epithelial cells            3-5               0 /hpf              Bacteria                    0                 0 /hpf              Casts                       3-5               0 /lpf         -HCG URINE, QL. - POC       Result                      Value             Ref Range           Pregnancy test,urine (*     NEGATIVE          NEG            )  Tests in the radiology section of CPT®: reviewed and ordered (Cta Abd Pelv W Wo Cont    Result Date: 12/4/2019  Clinical history: Lower abdominal pain, pulsatile abdominal mass. Chronic tissue disease.  Question AAA, appendicitis, ovarian cyst TECHNIQUE: Axial, coronal and sagittal CT angiograms of the abdomen/pelvis with 100 cc of IV contrast. Axial portal venous phase of the abdomen/pelvis was obtained CT dose reduction was achieved through use of a standardized protocol tailored for this examination and automatic exposure control for dose modulation. COMPARISON: None. FINDINGS: CTA: The abdominal aorta is normal in course and caliber. There is no abdominal aortic aneurysm or dissection. The celiac, superior mesenteric, renal, and inferior mesenteric arteries are unremarkable. CT ABDOMEN: Spleen is at the upper limits for size. The liver, gallbladder, pancreas, adrenal glands, and the kidneys are unremarkable. Abdominal aorta is normal in course and caliber. Small bowel loops are normal in caliber. No small bowel obstruction. CT PELVIS: Urinary bladder is normal in contour. Uterus is not well evaluated. The colon is normal in course and caliber. Appendix is within normal limits. There is no free air or free fluid. Surrounding bones are intact. IMPRESSION: 1. Abdominal aorta within normal limits. Negative for abdominal aortic aneurysm or dissection. Negative for abdominal mass lesion. 2. No acute findings are identified. Negative for colitis, diverticulitis, appendicitis or bowel obstruction. No hydronephrosis.  3. No evidence of ovarian cyst.    )           Procedures

## 2019-12-05 NOTE — ED TRIAGE NOTES
Pt states she has an abdominal U/S scheduled later this week, states PCP wanted to rule out aortic aneurism. Pt states she feels faint and dizzy and feels pressure increasing on her mid spine. Also has abd pain with nausea that comes and goes. States this has been ongoing for about a month.

## 2020-11-13 ENCOUNTER — APPOINTMENT (OUTPATIENT)
Dept: GENERAL RADIOLOGY | Age: 20
End: 2020-11-13
Attending: EMERGENCY MEDICINE
Payer: MEDICAID

## 2020-11-13 ENCOUNTER — HOSPITAL ENCOUNTER (EMERGENCY)
Age: 20
Discharge: HOME | End: 2020-11-13
Attending: EMERGENCY MEDICINE
Payer: MEDICAID

## 2020-11-13 VITALS
HEART RATE: 69 BPM | SYSTOLIC BLOOD PRESSURE: 118 MMHG | WEIGHT: 210 LBS | HEIGHT: 64 IN | BODY MASS INDEX: 35.85 KG/M2 | RESPIRATION RATE: 18 BRPM | TEMPERATURE: 98.3 F | DIASTOLIC BLOOD PRESSURE: 88 MMHG | OXYGEN SATURATION: 99 %

## 2020-11-13 DIAGNOSIS — R07.89 ATYPICAL CHEST PAIN: Primary | ICD-10-CM

## 2020-11-13 DIAGNOSIS — R22.41 LOCALIZED SWELLING OF RIGHT FOOT: ICD-10-CM

## 2020-11-13 LAB
ALBUMIN SERPL-MCNC: 4 G/DL (ref 3.5–5)
ALBUMIN/GLOB SERPL: 1.1 {RATIO} (ref 1.2–3.5)
ALP SERPL-CCNC: 105 U/L (ref 50–130)
ALT SERPL-CCNC: 26 U/L (ref 12–65)
ANION GAP SERPL CALC-SCNC: 5 MMOL/L (ref 7–16)
AST SERPL-CCNC: 23 U/L (ref 15–37)
BASOPHILS # BLD: 0 K/UL (ref 0–0.2)
BASOPHILS NFR BLD: 1 % (ref 0–2)
BILIRUB SERPL-MCNC: 0.5 MG/DL (ref 0.2–1.1)
BUN SERPL-MCNC: 7 MG/DL (ref 6–23)
CALCIUM SERPL-MCNC: 9.4 MG/DL (ref 8.3–10.4)
CHLORIDE SERPL-SCNC: 104 MMOL/L (ref 98–107)
CO2 SERPL-SCNC: 30 MMOL/L (ref 21–32)
CREAT SERPL-MCNC: 0.69 MG/DL (ref 0.6–1)
D DIMER PPP FEU-MCNC: 0.34 UG/ML(FEU)
DIFFERENTIAL METHOD BLD: ABNORMAL
EOSINOPHIL # BLD: 0.2 K/UL (ref 0–0.8)
EOSINOPHIL NFR BLD: 4 % (ref 0.5–7.8)
ERYTHROCYTE [DISTWIDTH] IN BLOOD BY AUTOMATED COUNT: 12.7 % (ref 11.9–14.6)
GLOBULIN SER CALC-MCNC: 3.7 G/DL (ref 2.3–3.5)
GLUCOSE SERPL-MCNC: 83 MG/DL (ref 65–100)
HCG UR QL: NEGATIVE
HCT VFR BLD AUTO: 41.5 % (ref 35.8–46.3)
HGB BLD-MCNC: 14 G/DL (ref 11.7–15.4)
IMM GRANULOCYTES # BLD AUTO: 0 K/UL (ref 0–0.5)
IMM GRANULOCYTES NFR BLD AUTO: 0 % (ref 0–5)
LYMPHOCYTES # BLD: 2.7 K/UL (ref 0.5–4.6)
LYMPHOCYTES NFR BLD: 47 % (ref 13–44)
MCH RBC QN AUTO: 29.7 PG (ref 26.1–32.9)
MCHC RBC AUTO-ENTMCNC: 33.7 G/DL (ref 31.4–35)
MCV RBC AUTO: 88.1 FL (ref 79.6–97.8)
MONOCYTES # BLD: 0.5 K/UL (ref 0.1–1.3)
MONOCYTES NFR BLD: 9 % (ref 4–12)
NEUTS SEG # BLD: 2.3 K/UL (ref 1.7–8.2)
NEUTS SEG NFR BLD: 40 % (ref 43–78)
NRBC # BLD: 0 K/UL (ref 0–0.2)
PLATELET # BLD AUTO: 232 K/UL (ref 150–450)
PMV BLD AUTO: 8.9 FL (ref 9.4–12.3)
POTASSIUM SERPL-SCNC: 3.5 MMOL/L (ref 3.5–5.1)
PROT SERPL-MCNC: 7.7 G/DL (ref 6.3–8.2)
RBC # BLD AUTO: 4.71 M/UL (ref 4.05–5.2)
SODIUM SERPL-SCNC: 139 MMOL/L (ref 136–145)
TROPONIN-HIGH SENSITIVITY: 5 PG/ML (ref 0–14)
WBC # BLD AUTO: 5.7 K/UL (ref 4.3–11.1)

## 2020-11-13 PROCEDURE — 73630 X-RAY EXAM OF FOOT: CPT

## 2020-11-13 PROCEDURE — 71046 X-RAY EXAM CHEST 2 VIEWS: CPT

## 2020-11-13 PROCEDURE — 85025 COMPLETE CBC W/AUTO DIFF WBC: CPT

## 2020-11-13 PROCEDURE — 81003 URINALYSIS AUTO W/O SCOPE: CPT

## 2020-11-13 PROCEDURE — 99285 EMERGENCY DEPT VISIT HI MDM: CPT

## 2020-11-13 PROCEDURE — 80053 COMPREHEN METABOLIC PANEL: CPT

## 2020-11-13 PROCEDURE — 81025 URINE PREGNANCY TEST: CPT

## 2020-11-13 PROCEDURE — 84484 ASSAY OF TROPONIN QUANT: CPT

## 2020-11-13 PROCEDURE — 93005 ELECTROCARDIOGRAM TRACING: CPT | Performed by: EMERGENCY MEDICINE

## 2020-11-13 PROCEDURE — 85379 FIBRIN DEGRADATION QUANT: CPT

## 2020-11-13 NOTE — ED TRIAGE NOTES
Pt states she had a vaginal birth 4 weeks ago. States she was tested positive for COVID 2 weeks ago. Developed \"chest pressure\" 2 days ago. Pt states she has had right foot swelling since Sunday. Denies shortness of breath. States she has been having occasional headache and dizziness. Mask in use.

## 2020-11-14 ENCOUNTER — PATIENT OUTREACH (OUTPATIENT)
Dept: CASE MANAGEMENT | Age: 20
End: 2020-11-14

## 2020-11-14 LAB
ATRIAL RATE: 69 BPM
CALCULATED P AXIS, ECG09: 54 DEGREES
CALCULATED R AXIS, ECG10: 57 DEGREES
CALCULATED T AXIS, ECG11: 46 DEGREES
DIAGNOSIS, 93000: NORMAL
P-R INTERVAL, ECG05: 142 MS
Q-T INTERVAL, ECG07: 370 MS
QRS DURATION, ECG06: 96 MS
QTC CALCULATION (BEZET), ECG08: 396 MS
VENTRICULAR RATE, ECG03: 69 BPM

## 2020-11-14 NOTE — DISCHARGE INSTRUCTIONS
Patient Education      Tylenol and Motrin for aches and pains. Ice right foot and elevate above heart is much as possible for several days for right foot swelling. Take over-the-counter Pepcid 20 mg twice daily for 2 weeks to see if this helps with your chest discomfort. Follow-up with your primary care doctor later this coming week if not improving. Return if any new, worsening or concerning symptoms. Musculoskeletal Chest Pain: Care Instructions  Your Care Instructions     Chest pain is not always a sign that something is wrong with your heart or that you have another serious problem. The doctor thinks your chest pain is caused by strained muscles or ligaments, inflamed chest cartilage, or another problem in your chest, rather than by your heart. You may need more tests to find the cause of your chest pain. Follow-up care is a key part of your treatment and safety. Be sure to make and go to all appointments, and call your doctor if you are having problems. It's also a good idea to know your test results and keep a list of the medicines you take. How can you care for yourself at home? · Take pain medicines exactly as directed. ? If the doctor gave you a prescription medicine for pain, take it as prescribed. ? If you are not taking a prescription pain medicine, ask your doctor if you can take an over-the-counter medicine. · Rest and protect the sore area. · Stop, change, or take a break from any activity that may be causing your pain or soreness. · Put ice or a cold pack on the sore area for 10 to 20 minutes at a time. Try to do this every 1 to 2 hours for the next 3 days (when you are awake) or until the swelling goes down. Put a thin cloth between the ice and your skin. · After 2 or 3 days, apply a heating pad set on low or a warm cloth to the area that hurts. Some doctors suggest that you go back and forth between hot and cold. · Do not wrap or tape your ribs for support.  This may cause you to take smaller breaths, which could increase your risk of lung problems. · Mentholated creams such as Bengay or Icy Hot may soothe sore muscles. Follow the instructions on the package. · Follow your doctor's instructions for exercising. · Gentle stretching and massage may help you get better faster. Stretch slowly to the point just before pain begins, and hold the stretch for at least 15 to 30 seconds. Do this 3 or 4 times a day. Stretch just after you have applied heat. · As your pain gets better, slowly return to your normal activities. Any increased pain may be a sign that you need to rest a while longer. When should you call for help? Call 911 anytime you think you may need emergency care. For example, call if:    · You have chest pain or pressure. This may occur with:  ? Sweating. ? Shortness of breath. ? Nausea or vomiting. ? Pain that spreads from the chest to the neck, jaw, or one or both shoulders or arms. ? Dizziness or lightheadedness. ? A fast or uneven pulse. After calling 911, chew 1 adult-strength aspirin. Wait for an ambulance. Do not try to drive yourself.     · You have sudden chest pain and shortness of breath, or you cough up blood. Call your doctor now or seek immediate medical care if:    · You have any trouble breathing.     · Your chest pain gets worse.     · Your chest pain occurs consistently with exercise and is relieved by rest.   Watch closely for changes in your health, and be sure to contact your doctor if:    · Your chest pain does not get better after 1 week. Where can you learn more? Go to http://www.cerda.com/  Enter V293 in the search box to learn more about \"Musculoskeletal Chest Pain: Care Instructions. \"  Current as of: June 26, 2019               Content Version: 12.6  © 8868-8885 [employer]. Care instructions adapted under license by [employer] (which disclaims liability or warranty for this information).  If you have questions about a medical condition or this instruction, always ask your healthcare professional. [place] [address] any warranty or liability for your use of this information.

## 2020-11-14 NOTE — ED NOTES
I have reviewed discharge instructions with the patient. The patient verbalized understanding. Patient left ED via Discharge Method: ambulatory to Home by herself. Opportunity for questions and clarification provided. Patient given 0 scripts. To continue your aftercare when you leave the hospital, you may receive an automated call from our care team to check in on how you are doing. This is a free service and part of our promise to provide the best care and service to meet your aftercare needs.  If you have questions, or wish to unsubscribe from this service please call [phone]. Thank you for Choosing our [facility] Emergency Department.

## 2020-11-14 NOTE — ED PROVIDER NOTES
Patient was diagnosed with coronavirus 2 weeks ago and delivered a baby 4 weeks ago. She presents now with 1 to 2 days of chest pain that feels like a fullness as well as a fullness in her head and swollen right foot. She wanted to come get her heart checked out since she had coronavirus. She has not had a fever for about a week. Her cough is resolved but she was never short of breath. No prior history of DVT or PE. Swelling isolated to the foot and no pain or swelling in her calf. He denies shortness of breath. She states earlier today she also felt a lump in her throat. The history is provided by the patient. Past Medical History:   Diagnosis Date    Autoimmune disease ([facility] [address]     LUPUS       No past surgical history on file. No family history on file.     Social History     Socioeconomic History    Marital status: SINGLE     Spouse name: Not on file    Number of children: Not on file    Years of education: Not on file    Highest education level: Not on file   Occupational History    Not on file   Social Needs    Financial resource strain: Not on file    Food insecurity     Worry: Not on file     Inability: Not on file    Transportation needs     Medical: Not on file     Non-medical: Not on file   Tobacco Use    Smoking status: Never Smoker    Smokeless tobacco: Never Used   Substance and Sexual Activity    Alcohol use: No     Frequency: Never    Drug use: No    Sexual activity: Never   Lifestyle    Physical activity     Days per week: Not on file     Minutes per session: Not on file    Stress: Not on file   Relationships    Social connections     Talks on phone: Not on file     Gets together: Not on file     Attends Rastafari service: Not on file     Active member of club or organization: Not on file     Attends meetings of clubs or organizations: Not on file     Relationship status: Not on file    Intimate partner violence     Fear of current or ex partner: Not on file Emotionally abused: Not on file     Physically abused: Not on file     Forced sexual activity: Not on file   Other Topics Concern    Not on file   Social History Narrative    Not on file         ALLERGIES: Aspirin    Review of Systems   Constitutional: Negative for chills. HENT: Negative for congestion, rhinorrhea and sore throat. Cardiovascular: Negative for leg swelling. Gastrointestinal: Negative for diarrhea. Endocrine: Negative for polydipsia and polyuria. Genitourinary: Negative for dysuria, hematuria and urgency. Musculoskeletal: Negative for myalgias. Skin: Negative for color change and rash. Vitals:    11/13/20 1841   BP: 116/79   Pulse: 72   Resp: 14   Temp: 98.3 °F (36.8 °C)   SpO2: 98%   Weight: 95.3 kg (210 lb)   Height: 5' 4\" (1.626 m)            Physical Exam  Vitals signs and nursing note reviewed. Constitutional:       General: She is not in acute distress. Appearance: She is well-developed. HENT:      Head: Normocephalic. Eyes:      Pupils: Pupils are equal, round, and reactive to light. Cardiovascular:      Rate and Rhythm: Normal rate and regular rhythm. Pulses:           Carotid pulses are 2+ on the right side and 2+ on the left side. Radial pulses are 2+ on the right side and 2+ on the left side. Heart sounds: Normal heart sounds. Pulmonary:      Effort: Pulmonary effort is normal.      Breath sounds: Normal breath sounds. Abdominal:      General: There is no distension. Palpations: Abdomen is soft. There is no mass. Tenderness: There is no abdominal tenderness. There is no guarding or rebound. Musculoskeletal: Normal range of motion. Right lower leg: She exhibits no tenderness. No edema. Left lower leg: She exhibits no tenderness. No edema. Comments: Right foot is mildly swollen. No warmth or erythema or tenderness. Neurovascularly intact. No calf tenderness.    Lymphadenopathy:      Cervical: No cervical adenopathy. Skin:     General: Skin is warm and dry. Neurological:      Mental Status: She is alert. MDM  Number of Diagnoses or Management Options  Diagnosis management comments: Basic labs EKG troponin D-dimer and chest x-ray ordered. If D-dimer positive proceed with chest CT and right lower extremity ultrasound. Would be highly unusual to have a swollen foot as the only symptom of a DVT. Lump in the throat sensation consistent with globus hystericus. Patient does not appear ill or toxic and vitals are stable. 9:11 PM  EKG normal troponin and D-dimer normal.  X-ray of the right foot negative. Chest x-ray normal.  No sign of pericarditis myocarditis or cardiac ischemia. No PE. Doubt aortic dissection given equal pulses bilaterally and normal mediastinum on chest x-ray. Doubt DVT given isolated foot swelling and normal D-dimer.        Amount and/or Complexity of Data Reviewed  Clinical lab tests: ordered and reviewed (Results for orders placed or performed during the hospital encounter of 11/13/20  -CBC WITH AUTOMATED DIFF       Result                      Value             Ref Range           WBC                         5.7               4.3 - 11.1 K*       RBC                         4.71              4.05 - 5.2 M*       HGB                         14.0              11.7 - 15.4 *       HCT                         41.5              35.8 - 46.3 %       MCV                         88.1              79.6 - 97.8 *       MCH                         29.7              26.1 - 32.9 *       MCHC                        33.7              31.4 - 35.0 *       RDW                         12.7              11.9 - 14.6 %       PLATELET                    232               150 - 450 K/*       MPV                         8.9 (L)           9.4 - 12.3 FL       ABSOLUTE NRBC               0.00              0.0 - 0.2 K/*       DF                          AUTOMATED                             NEUTROPHILS 40 (L)            43 - 78 %           LYMPHOCYTES                 47 (H)            13 - 44 %           MONOCYTES                   9                 4.0 - 12.0 %        EOSINOPHILS                 4                 0.5 - 7.8 %         BASOPHILS                   1                 0.0 - 2.0 %         IMMATURE GRANULOCYTES       0                 0.0 - 5.0 %         ABS. NEUTROPHILS            2.3               1.7 - 8.2 K/*       ABS. LYMPHOCYTES            2.7               0.5 - 4.6 K/*       ABS. MONOCYTES              0.5               0.1 - 1.3 K/*       ABS. EOSINOPHILS            0.2               0.0 - 0.8 K/*       ABS. BASOPHILS              0.0               0.0 - 0.2 K/*       ABS. IMM. GRANS.            0.0               0.0 - 0.5 K/*  -METABOLIC PANEL, COMPREHENSIVE       Result                      Value             Ref Range           Sodium                      139               136 - 145 mm*       Potassium                   3.5               3.5 - 5.1 mm*       Chloride                    104               98 - 107 mmo*       CO2                         30                21 - 32 mmol*       Anion gap                   5 (L)             7 - 16 mmol/L       Glucose                     83                65 - 100 mg/*       BUN                         7                 6 - 23 MG/DL        Creatinine                  0.69              0.6 - 1.0 MG*       GFR est AA                  >60               >60 ml/min/1*       GFR est non-AA              >60               >60 ml/min/1*       Calcium                     9.4               8.3 - 10.4 M*       Bilirubin, total            0.5               0.2 - 1.1 MG*       ALT (SGPT)                  26                12 - 65 U/L         AST (SGOT)                  23                15 - 37 U/L         Alk.  phosphatase            105               50 - 130 U/L        Protein, total              7.7               6.3 - 8.2 g/*       Albumin 4.0               3.5 - 5.0 g/*       Globulin                    3.7 (H)           2.3 - 3.5 g/*       A-G Ratio                   1.1 (L)           1.2 - 3.5      -D DIMER       Result                      Value             Ref Range           D DIMER                     0.34              <0.56 ug/ml(*  -TROPONIN-HIGH SENSITIVITY       Result                      Value             Ref Range           Troponin-High Sensitiv*     5.0               0 - 14 pg/mL   -HCG URINE, QL. - POC       Result                      Value             Ref Range           Pregnancy test,urine (*     Negative          NEG            )  Tests in the radiology section of CPT®: ordered and reviewed (Xr Chest Pa Lat    Result Date: 11/13/2020  EXAM: 2 view chest radiograph. INDICATION: Chest pain. COMPARISON: Chest radiograph dated November 14, 2019. FINDINGS: No focal lung consolidation. No pneumothorax. No pleural effusion. The heart is normal in size. No evidence of acute osseous abnormality. IMPRESSION: 1. No acute cardiopulmonary abnormality. Xr Foot Rt Min 3 V    Result Date: 11/13/2020  EXAM: 3 views of the right foot. INDICATION: Right foot pain and swelling. COMPARISON: None. FINDINGS: No evidence of fracture or dislocation. Joint spaces are preserved. Focal soft tissue swelling of the dorsum of the forefoot. Bone mineralization is normal.     IMPRESSION: 1. No evidence of acute fracture.  Focal dorsal forefoot soft tissue swelling.     )    Risk of Complications, Morbidity, and/or Mortality  Presenting problems: high  Diagnostic procedures: low  Management options: low           Procedures

## 2020-11-17 ENCOUNTER — PATIENT OUTREACH (OUTPATIENT)
Dept: CASE MANAGEMENT | Age: 20
End: 2020-11-17

## 2021-07-12 PROBLEM — F41.9 ANXIETY: Status: ACTIVE | Noted: 2021-07-12

## 2022-03-19 PROBLEM — F41.9 ANXIETY: Status: ACTIVE | Noted: 2021-07-12

## 2022-05-31 ENCOUNTER — TELEPHONE (OUTPATIENT)
Dept: PRIMARY CARE CLINIC | Facility: CLINIC | Age: 22
End: 2022-05-31

## 2022-05-31 NOTE — TELEPHONE ENCOUNTER
Pt  Pharmacy is requesting pre authorization for her medication adderall both 5mg and 10 mg , [facility] [phone], thank you.

## 2022-06-01 NOTE — TELEPHONE ENCOUNTER
Called pharmacy and they advised that they don't know why patient would need prior auth that they were able to fill with no problem. Notified patient. She will  at pharmacy later today.

## 2022-07-06 ENCOUNTER — TELEPHONE (OUTPATIENT)
Dept: PRIMARY CARE CLINIC | Facility: CLINIC | Age: 22
End: 2022-07-06

## 2022-07-06 NOTE — TELEPHONE ENCOUNTER
10 and 5 mg adderall needs refill. ... please fill at [facility], [address] [phone]

## 2022-07-12 DIAGNOSIS — F98.8 ATTENTION DEFICIT DISORDER, UNSPECIFIED HYPERACTIVITY PRESENCE: Primary | ICD-10-CM

## 2022-07-12 RX ORDER — DEXTROAMPHETAMINE SACCHARATE, AMPHETAMINE ASPARTATE, DEXTROAMPHETAMINE SULFATE AND AMPHETAMINE SULFATE 1.25; 1.25; 1.25; 1.25 MG/1; MG/1; MG/1; MG/1
TABLET ORAL
Qty: 30 TABLET | Refills: 0 | Status: SHIPPED | OUTPATIENT
Start: 2022-07-12 | End: 2022-08-11 | Stop reason: SDUPTHER

## 2022-07-12 RX ORDER — DEXTROAMPHETAMINE SACCHARATE, AMPHETAMINE ASPARTATE, DEXTROAMPHETAMINE SULFATE AND AMPHETAMINE SULFATE 2.5; 2.5; 2.5; 2.5 MG/1; MG/1; MG/1; MG/1
TABLET ORAL
Qty: 30 TABLET | Refills: 0 | Status: SHIPPED | OUTPATIENT
Start: 2022-07-12 | End: 2022-08-11 | Stop reason: SDUPTHER

## 2022-08-10 ENCOUNTER — TELEPHONE (OUTPATIENT)
Dept: PRIMARY CARE CLINIC | Facility: CLINIC | Age: 22
End: 2022-08-10

## 2022-08-10 RX ORDER — ARIPIPRAZOLE 5 MG/1
TABLET ORAL
Qty: 30 TABLET | Refills: 3 | OUTPATIENT
Start: 2022-08-10

## 2022-08-11 DIAGNOSIS — F98.8 ATTENTION DEFICIT DISORDER, UNSPECIFIED HYPERACTIVITY PRESENCE: ICD-10-CM

## 2022-08-11 RX ORDER — DEXTROAMPHETAMINE SACCHARATE, AMPHETAMINE ASPARTATE, DEXTROAMPHETAMINE SULFATE AND AMPHETAMINE SULFATE 1.25; 1.25; 1.25; 1.25 MG/1; MG/1; MG/1; MG/1
TABLET ORAL
Qty: 30 TABLET | Refills: 0 | Status: SHIPPED | OUTPATIENT
Start: 2022-08-11 | End: 2022-09-08 | Stop reason: SDUPTHER

## 2022-08-11 RX ORDER — DEXTROAMPHETAMINE SACCHARATE, AMPHETAMINE ASPARTATE, DEXTROAMPHETAMINE SULFATE AND AMPHETAMINE SULFATE 2.5; 2.5; 2.5; 2.5 MG/1; MG/1; MG/1; MG/1
TABLET ORAL
Qty: 30 TABLET | Refills: 0 | Status: SHIPPED | OUTPATIENT
Start: 2022-08-11 | End: 2022-09-08 | Stop reason: SDUPTHER

## 2022-08-11 RX ORDER — ARIPIPRAZOLE 5 MG/1
5 TABLET ORAL DAILY
Qty: 30 TABLET | Refills: 0 | Status: SHIPPED | OUTPATIENT
Start: 2022-08-11 | End: 2022-10-27 | Stop reason: SDUPTHER

## 2022-08-12 ENCOUNTER — TELEMEDICINE (OUTPATIENT)
Dept: INTERNAL MEDICINE CLINIC | Facility: CLINIC | Age: 22
End: 2022-08-12
Payer: COMMERCIAL

## 2022-08-12 DIAGNOSIS — E66.9 OBESITY (BMI 35.0-39.9 WITHOUT COMORBIDITY): Primary | ICD-10-CM

## 2022-08-12 PROCEDURE — 99214 OFFICE O/P EST MOD 30 MIN: CPT | Performed by: NURSE PRACTITIONER

## 2022-08-12 RX ORDER — SEMAGLUTIDE 2.4 MG/.75ML
2.4 INJECTION, SOLUTION SUBCUTANEOUS
Qty: 3 ML | Refills: 2 | Status: SHIPPED | OUTPATIENT
Start: 2022-08-12

## 2022-08-12 RX ORDER — SEMAGLUTIDE 1 MG/.5ML
1 INJECTION, SOLUTION SUBCUTANEOUS
Qty: 1 ML | Refills: 0 | Status: SHIPPED | OUTPATIENT
Start: 2022-08-12

## 2022-08-12 RX ORDER — SEMAGLUTIDE 1.7 MG/.75ML
1.7 INJECTION, SOLUTION SUBCUTANEOUS
Qty: 1.5 ML | Refills: 0 | Status: SHIPPED | OUTPATIENT
Start: 2022-08-12

## 2022-08-12 RX ORDER — SEMAGLUTIDE 0.5 MG/.5ML
0.5 INJECTION, SOLUTION SUBCUTANEOUS
Qty: 1 ML | Refills: 0 | Status: SHIPPED | OUTPATIENT
Start: 2022-08-12

## 2022-08-12 NOTE — PROGRESS NOTES
[unfilled]  [address] [place] [id number]      PROGRESS NOTE    SUBJECTIVE:   Mahin Dutta is a 19 y.o. female seen for a follow up visit regarding the following chief complaint:     Chief Complaint   Patient presents with    Other     Weight managemetn       HPI  Patient presents with concerns of weight gain and difficulty losing weight. She is currently following psychiatry with diagnosis of binge eating disorder. She was placed on adderall but states this has not helped her weight loss. She is requesting medication to help with weight loss due to morbid obesity. She has tried to control caloric intake and exercising with walking. BMI 39.65. Current Outpatient Medications   Medication Sig Dispense Refill    Semaglutide-Weight Management (WEGOVY) 0.5 MG/0.5ML SOAJ SC injection Inject 0.5 mg into the skin every 7 days 1 mL 0    Semaglutide-Weight Management (WEGOVY) 1.7 MG/0.75ML SOAJ SC injection Inject 1.7 mg into the skin every 7 days 1.5 mL 0    Semaglutide-Weight Management (WEGOVY) 1 MG/0.5ML SOAJ SC injection Inject 1 mg into the skin every 7 days 1 mL 0    Semaglutide-Weight Management (WEGOVY) 2.4 MG/0.75ML SOAJ SC injection Inject 2.4 mg into the skin every 7 days 3 mL 2    amphetamine-dextroamphetamine (ADDERALL) 5 MG tablet Take 10 mg once in the morning and 5 mg in the afternoon. Please fill both prescriptions for 10 mg and 5 mg tablets. 30 tablet 0    ARIPiprazole (ABILIFY) 5 MG tablet Take 1 tablet by mouth in the morning. 30 tablet 0    amphetamine-dextroamphetamine (ADDERALL) 10 MG tablet Take 10 mg once daily in the morning and 5 mg once daily in the afternoon. Please fill both prescriptions for 10 mg and 5 mg tablets. 30 tablet 0    escitalopram (LEXAPRO) 20 MG tablet Take 20 mg by mouth daily       No current facility-administered medications for this visit.      Allergies   Allergen Reactions    Aspirin Itching       Past Medical History:   Diagnosis Date Autoimmune disease ([facility] [address]     LUPUS     Past Surgical History:   Procedure Laterality Date    WISDOM TOOTH EXTRACTION  2019     Family History   Problem Relation Age of Onset    Thyroid Disease Mother     Heart Failure Mother     Bipolar Disorder Maternal Grandfather      Social History     Tobacco Use    Smoking status: Never    Smokeless tobacco: Never   Substance Use Topics    Alcohol use: No       Review of Systems   Constitutional:  Negative for activity change, appetite change, chills, fatigue and fever. Weight gain   HENT:  Negative for congestion, ear pain and sinus pain. Eyes:  Negative for pain, discharge, redness and itching. Respiratory:  Negative for apnea, cough and shortness of breath. Cardiovascular:  Negative for chest pain, palpitations and leg swelling. Gastrointestinal:  Negative for abdominal distention, abdominal pain, constipation, diarrhea and nausea. Endocrine: Negative for cold intolerance and heat intolerance. Genitourinary:  Negative for difficulty urinating, dysuria, enuresis and urgency. Musculoskeletal:  Negative for arthralgias, back pain, joint swelling, myalgias and neck pain. Skin:  Negative for color change and rash. Neurological:  Negative for dizziness, weakness and headaches. Psychiatric/Behavioral:  Negative for behavioral problems and sleep disturbance. The patient is not nervous/anxious. OBJECTIVE:  There were no vitals taken for this visit. Physical Exam  Constitutional:       General: She is not in acute distress. Appearance: Normal appearance. She is obese. She is not ill-appearing or toxic-appearing. Cardiovascular:      Rate and Rhythm: Normal rate. Pulmonary:      Effort: Pulmonary effort is normal. No respiratory distress. Skin:     General: Skin is warm and dry. Neurological:      Mental Status: She is alert. Mental status is at baseline.    Psychiatric:         Mood and Affect: Mood normal.         Behavior: Behavior normal.         Thought Content: Thought content normal.         EUN and Darius Carlos was seen today for other. Diagnoses and all orders for this visit:    Obesity (BMI 35.0-39.9 without comorbidity)  -     Semaglutide-Weight Management (WEGOVY) 0.5 MG/0.5ML SOAJ SC injection; Inject 0.5 mg into the skin every 7 days  -     Semaglutide-Weight Management (WEGOVY) 1.7 MG/0.75ML SOAJ SC injection; Inject 1.7 mg into the skin every 7 days  -     Semaglutide-Weight Management (WEGOVY) 1 MG/0.5ML SOAJ SC injection; Inject 1 mg into the skin every 7 days  -     Semaglutide-Weight Management (WEGOVY) 2.4 MG/0.75ML SOAJ SC injection; Inject 2.4 mg into the skin every 7 days  We discussed trial of wegovy. SE and admin discussed in detail. Advised to continue healthy diet with 1500 calories daily, including mediterranean meals and regular exercise with goal normal BMI. Pursuant to the emergency declaration under the [facility], [facility] waiver authority and the [employer] and [employer] Act, this Virtual Visit was conducted, with patient's consent, to reduce the patient's risk of exposure to COVID-19 and provide continuity of care for an established patient. Services were provided through a video synchronous discussion virtually to substitute for in-person clinic visit. Patient consented to virtual visit. Greater than 50% of this 25 min visit was spent counseling the patient about test results, prognosis, importance of compliance, education about disease process, benefits of medications, instructions for management of acute symptoms, and follow up plans. Follow-up and Dispositions    Return in about 3 months (around 11/12/2022) for Weight management recheck.          Luis Cruz, LUAN, APRN - CNP

## 2022-08-17 ASSESSMENT — ENCOUNTER SYMPTOMS
APNEA: 0
EYE PAIN: 0
EYE REDNESS: 0
CONSTIPATION: 0
DIARRHEA: 0
NAUSEA: 0
BACK PAIN: 0
ABDOMINAL PAIN: 0
SHORTNESS OF BREATH: 0
EYE DISCHARGE: 0
COUGH: 0
SINUS PAIN: 0
COLOR CHANGE: 0
ABDOMINAL DISTENTION: 0
EYE ITCHING: 0

## 2022-09-06 ENCOUNTER — TELEPHONE (OUTPATIENT)
Dept: PRIMARY CARE CLINIC | Facility: CLINIC | Age: 22
End: 2022-09-06

## 2022-09-06 NOTE — TELEPHONE ENCOUNTER
Pt next apt 11/22/2022 pt requesting medication refill on adderral 5/10 mg [facility] [address], please call pt back thank you.

## 2022-09-08 DIAGNOSIS — F98.8 ATTENTION DEFICIT DISORDER, UNSPECIFIED HYPERACTIVITY PRESENCE: ICD-10-CM

## 2022-09-08 RX ORDER — DEXTROAMPHETAMINE SACCHARATE, AMPHETAMINE ASPARTATE, DEXTROAMPHETAMINE SULFATE AND AMPHETAMINE SULFATE 2.5; 2.5; 2.5; 2.5 MG/1; MG/1; MG/1; MG/1
TABLET ORAL
Qty: 30 TABLET | Refills: 0 | Status: SHIPPED | OUTPATIENT
Start: 2022-09-08 | End: 2022-10-09 | Stop reason: SDUPTHER

## 2022-09-08 RX ORDER — ARIPIPRAZOLE 5 MG/1
TABLET ORAL
Qty: 30 TABLET | Refills: 0 | OUTPATIENT
Start: 2022-09-08

## 2022-09-08 RX ORDER — DEXTROAMPHETAMINE SACCHARATE, AMPHETAMINE ASPARTATE, DEXTROAMPHETAMINE SULFATE AND AMPHETAMINE SULFATE 1.25; 1.25; 1.25; 1.25 MG/1; MG/1; MG/1; MG/1
TABLET ORAL
Qty: 30 TABLET | Refills: 0 | Status: SHIPPED | OUTPATIENT
Start: 2022-09-08 | End: 2022-10-09 | Stop reason: SDUPTHER

## 2022-09-15 ENCOUNTER — TELEPHONE (OUTPATIENT)
Dept: INTERNAL MEDICINE CLINIC | Facility: CLINIC | Age: 22
End: 2022-09-15

## 2022-09-15 NOTE — TELEPHONE ENCOUNTER
Verbally confirmed this with pharmacy who gave verbal understanding and stated there was no problem with filling rx. LVM informing pt that rx could be picked up.

## 2022-09-20 ENCOUNTER — TELEPHONE (OUTPATIENT)
Dept: INTERNAL MEDICINE CLINIC | Facility: CLINIC | Age: 22
End: 2022-09-20

## 2022-09-20 NOTE — TELEPHONE ENCOUNTER
Pt called through the call center nurse triage line c/o suicidal and homicidal thoughts. I spoke with the pt who states that her depression has worsened over the last few days and she has been crying a lot at night. She states that she is currently at the grocery store and is unable to do a virtual visit with a provider at our office at this time. Pt has not yet contacted her Psychiatrist' office for advise. I scheduled pt to see Tino Aburto NP for tomorrow morning at 8:30 in the office to discuss and asked that pt also reach out to Dr. Marcio browne this afternoon for advise. I asked pt if she felt that she would be fine until her appt tomorrow morning and pt states that she would be okay until then. She will contact Dr. Marcio browne today and pt agreed that she will seek care at the ER if her suicidal/homicidal thoughts worsened and felt that she can not ignore them.

## 2022-09-22 ENCOUNTER — TELEPHONE (OUTPATIENT)
Dept: INTERNAL MEDICINE CLINIC | Facility: CLINIC | Age: 22
End: 2022-09-22

## 2022-09-22 NOTE — TELEPHONE ENCOUNTER
Initiated PA on cover my meds through [employer] [facility]FORT CHELSI . 5, 1, 1.7, and 2.4mL.  Key: [id number]

## 2022-09-28 ENCOUNTER — TELEPHONE (OUTPATIENT)
Dept: INTERNAL MEDICINE CLINIC | Facility: CLINIC | Age: 22
End: 2022-09-28

## 2022-09-28 NOTE — TELEPHONE ENCOUNTER
Pt did not show for appt on 9/21/22. Pt did call and back and reschedule another appt. No show letter #4 mailed to pt per Jacki.

## 2022-10-03 ENCOUNTER — TELEPHONE (OUTPATIENT)
Dept: PRIMARY CARE CLINIC | Facility: CLINIC | Age: 22
End: 2022-10-03

## 2022-10-03 NOTE — TELEPHONE ENCOUNTER
Patient called requesting refill on mediation  Abilify 5 mg. Patient request for you to call her back regarding a situation that she want to let you know.       [facility] pharmacy

## 2022-10-03 NOTE — TELEPHONE ENCOUNTER
Patient stopped taking the Abilify 5 about a month ago but has started having mood swings. Would like to know if something different can be sent as a mood stabilizer in place of this. Was having suicidal thoughts last week and could not stop crying. Those thought have since stopped but is feeling hopeless and no motivation at all. Is scheduled to come in office for sooner appt on 10/11 also provided crisis line info of [phone] as well as [address].

## 2022-10-06 ENCOUNTER — TELEPHONE (OUTPATIENT)
Dept: PRIMARY CARE CLINIC | Facility: CLINIC | Age: 22
End: 2022-10-06

## 2022-10-07 ENCOUNTER — TELEPHONE (OUTPATIENT)
Dept: BEHAVIORAL/MENTAL HEALTH CLINIC | Age: 22
End: 2022-10-07

## 2022-10-07 NOTE — TELEPHONE ENCOUNTER
[place] [employer] has been denied. Member must try and fail at least two of the following drug alternatives: (aripiprazole, ziprasidone, quetiapine, risperidone, olanzapine).

## 2022-10-09 DIAGNOSIS — F98.8 ATTENTION DEFICIT DISORDER, UNSPECIFIED HYPERACTIVITY PRESENCE: ICD-10-CM

## 2022-10-09 RX ORDER — DEXTROAMPHETAMINE SACCHARATE, AMPHETAMINE ASPARTATE, DEXTROAMPHETAMINE SULFATE AND AMPHETAMINE SULFATE 2.5; 2.5; 2.5; 2.5 MG/1; MG/1; MG/1; MG/1
TABLET ORAL
Qty: 30 TABLET | Refills: 0 | Status: SHIPPED | OUTPATIENT
Start: 2022-10-09 | End: 2022-11-08

## 2022-10-09 RX ORDER — DEXTROAMPHETAMINE SACCHARATE, AMPHETAMINE ASPARTATE, DEXTROAMPHETAMINE SULFATE AND AMPHETAMINE SULFATE 1.25; 1.25; 1.25; 1.25 MG/1; MG/1; MG/1; MG/1
TABLET ORAL
Qty: 30 TABLET | Refills: 0 | Status: SHIPPED | OUTPATIENT
Start: 2022-10-09 | End: 2022-11-08

## 2022-10-10 RX ORDER — ARIPIPRAZOLE 5 MG/1
TABLET ORAL
Qty: 30 TABLET | Refills: 0 | OUTPATIENT
Start: 2022-10-10

## 2022-10-11 ENCOUNTER — OFFICE VISIT (OUTPATIENT)
Dept: BEHAVIORAL/MENTAL HEALTH CLINIC | Age: 22
End: 2022-10-11
Payer: COMMERCIAL

## 2022-10-11 VITALS
BODY MASS INDEX: 40.95 KG/M2 | SYSTOLIC BLOOD PRESSURE: 122 MMHG | TEMPERATURE: 98.5 F | DIASTOLIC BLOOD PRESSURE: 78 MMHG | OXYGEN SATURATION: 99 % | WEIGHT: 245.8 LBS | HEART RATE: 73 BPM | HEIGHT: 65 IN

## 2022-10-11 DIAGNOSIS — F98.8 ATTENTION DEFICIT DISORDER, UNSPECIFIED HYPERACTIVITY PRESENCE: ICD-10-CM

## 2022-10-11 DIAGNOSIS — F41.1 GENERALIZED ANXIETY DISORDER WITH PANIC ATTACKS: ICD-10-CM

## 2022-10-11 DIAGNOSIS — F31.5 BIPOLAR DISORDER, CURRENT EPISODE DEPRESSED, SEVERE, WITH PSYCHOTIC FEATURES (HCC): Primary | ICD-10-CM

## 2022-10-11 DIAGNOSIS — F50.81 BINGE EATING DISORDER: ICD-10-CM

## 2022-10-11 DIAGNOSIS — R45.851 PASSIVE SUICIDAL IDEATIONS: ICD-10-CM

## 2022-10-11 DIAGNOSIS — F41.0 GENERALIZED ANXIETY DISORDER WITH PANIC ATTACKS: ICD-10-CM

## 2022-10-11 DIAGNOSIS — E66.01 OBESITY, CLASS III, BMI 40-49.9 (MORBID OBESITY) (HCC): ICD-10-CM

## 2022-10-11 PROCEDURE — 99213 OFFICE O/P EST LOW 20 MIN: CPT | Performed by: PSYCHIATRY & NEUROLOGY

## 2022-10-11 PROCEDURE — 90833 PSYTX W PT W E/M 30 MIN: CPT | Performed by: PSYCHIATRY & NEUROLOGY

## 2022-10-11 RX ORDER — ESCITALOPRAM OXALATE 20 MG/1
20 TABLET ORAL DAILY
Qty: 30 TABLET | Refills: 3 | Status: SHIPPED | OUTPATIENT
Start: 2022-10-11

## 2022-10-11 ASSESSMENT — PATIENT HEALTH QUESTIONNAIRE - PHQ9
SUM OF ALL RESPONSES TO PHQ QUESTIONS 1-9: 22
4. FEELING TIRED OR HAVING LITTLE ENERGY: 3
1. LITTLE INTEREST OR PLEASURE IN DOING THINGS: 3
SUM OF ALL RESPONSES TO PHQ QUESTIONS 1-9: 20
8. MOVING OR SPEAKING SO SLOWLY THAT OTHER PEOPLE COULD HAVE NOTICED. OR THE OPPOSITE, BEING SO FIGETY OR RESTLESS THAT YOU HAVE BEEN MOVING AROUND A LOT MORE THAN USUAL: 2
SUM OF ALL RESPONSES TO PHQ QUESTIONS 1-9: 22
SUM OF ALL RESPONSES TO PHQ QUESTIONS 1-9: 22
5. POOR APPETITE OR OVEREATING: 3
2. FEELING DOWN, DEPRESSED OR HOPELESS: 2
3. TROUBLE FALLING OR STAYING ASLEEP: 3
6. FEELING BAD ABOUT YOURSELF - OR THAT YOU ARE A FAILURE OR HAVE LET YOURSELF OR YOUR FAMILY DOWN: 1
7. TROUBLE CONCENTRATING ON THINGS, SUCH AS READING THE NEWSPAPER OR WATCHING TELEVISION: 3
9. THOUGHTS THAT YOU WOULD BE BETTER OFF DEAD, OR OF HURTING YOURSELF: 2
SUM OF ALL RESPONSES TO PHQ9 QUESTIONS 1 & 2: 5
10. IF YOU CHECKED OFF ANY PROBLEMS, HOW DIFFICULT HAVE THESE PROBLEMS MADE IT FOR YOU TO DO YOUR WORK, TAKE CARE OF THINGS AT HOME, OR GET ALONG WITH OTHER PEOPLE: 2

## 2022-10-11 ASSESSMENT — COLUMBIA-SUICIDE SEVERITY RATING SCALE - C-SSRS
4. HAVE YOU HAD THESE THOUGHTS AND HAD SOME INTENTION OF ACTING ON THEM?: YES
5. HAVE YOU STARTED TO WORK OUT OR WORKED OUT THE DETAILS OF HOW TO KILL YOURSELF? DO YOU INTEND TO CARRY OUT THIS PLAN?: NO
7. DID THIS OCCUR IN THE LAST THREE MONTHS: YES
3. HAVE YOU BEEN THINKING ABOUT HOW YOU MIGHT KILL YOURSELF?: YES
2. HAVE YOU ACTUALLY HAD ANY THOUGHTS OF KILLING YOURSELF?: YES
1. WITHIN THE PAST MONTH, HAVE YOU WISHED YOU WERE DEAD OR WISHED YOU COULD GO TO SLEEP AND NOT WAKE UP?: YES
6. HAVE YOU EVER DONE ANYTHING, STARTED TO DO ANYTHING, OR PREPARED TO DO ANYTHING TO END YOUR LIFE?: YES

## 2022-10-11 ASSESSMENT — ANXIETY QUESTIONNAIRES
5. BEING SO RESTLESS THAT IT IS HARD TO SIT STILL: 0
IF YOU CHECKED OFF ANY PROBLEMS ON THIS QUESTIONNAIRE, HOW DIFFICULT HAVE THESE PROBLEMS MADE IT FOR YOU TO DO YOUR WORK, TAKE CARE OF THINGS AT HOME, OR GET ALONG WITH OTHER PEOPLE: VERY DIFFICULT
4. TROUBLE RELAXING: 2
1. FEELING NERVOUS, ANXIOUS, OR ON EDGE: 3
GAD7 TOTAL SCORE: 14
7. FEELING AFRAID AS IF SOMETHING AWFUL MIGHT HAPPEN: 1
6. BECOMING EASILY ANNOYED OR IRRITABLE: 2
2. NOT BEING ABLE TO STOP OR CONTROL WORRYING: 3
3. WORRYING TOO MUCH ABOUT DIFFERENT THINGS: 3

## 2022-10-11 NOTE — PROGRESS NOTES
Patient:  Eduin Prieto  Age:  19 y.o.  :  2000     SEX:  female MRN:  [id number]     RACE: White (non-)     SEEN:  [x]  PATIENT  []  SPOUSE []  OTHER:              PHQ-9  10/11/2022 3/24/2022 1/3/2022   Little interest or pleasure in doing things 3 - -   Little interest or pleasure in doing things - 2 1   Feeling down, depressed, or hopeless 2 - -   Trouble falling or staying asleep, or sleeping too much 3 - -   Trouble falling or staying asleep, or sleeping too much - 1 0   Feeling tired or having little energy 3 - -   Feeling tired or having little energy - 2 1   Poor appetite or overeating 3 - -   Poor appetite, weight loss, or overeating - 3 0   Feeling bad about yourself - or that you are a failure or have let yourself or your family down 1 - -   Feeling bad about yourself - or that you are a failure or have let yourself or your family down - 2 2   Trouble concentrating on things, such as reading the newspaper or watching television 3 - -   Trouble concentrating on things such as school, work, reading, or watching TV - 1 0   Moving or speaking so slowly that other people could have noticed. Or the opposite - being so fidgety or restless that you have been moving around a lot more than usual 2 - -   Moving or speaking so slowly that other people could have noticed; or the opposite being so fidgety that others notice - 0 0   Thoughts that you would be better off dead, or of hurting yourself in some way 2 - -   Thoughts of being better off dead, or hurting yourself in some way - 1 0   PHQ-2 Score 5 - -   Total Score PHQ 2 - 2 2   PHQ-9 Total Score 22 - -   PHQ 9 Score - 12 5   If you checked off any problems, how difficult have these problems made it for you to do your work, take care of things at home, or get along with other people?  2 - -   How difficult have these problems made it for you to do your work, take care of your home and get along with others - Somewhat difficult Somewhat difficult       REG[place]-7 SCREENING 10/11/2022 3/24/2022 1/3/2022   Feeling nervous, anxious, or on edge Nearly every day - -   Not being able to stop or control worrying Nearly every day - -   Worrying too much about different things Nearly every day - -   Trouble relaxing More than half the days - -   Being so restless that it is hard to sit still Not at all - -   Becoming easily annoyed or irritable More than half the days - -   Feeling afraid as if something awful might happen Several days - -   [place]-7 Total Score 14 - -   How difficult have these problems made it for you to do your work, take care of things at home, or get along with other people? Very difficult Very difficult Somewhat difficult   Feeling nervous, anxious, or on edge - Not at all Several days   [place]-7 Total Score - 17 5         Chief complaint:  Pt says he has been depressed and anxious most of the last 1 month. Subjective:  Seen for follow-up. Seeing the patient after 6 months at the last visit. States has been depressed and anxious. Worse for the last 1 month. Has trouble doing basic things with no motivation. Its more than just diffuse and sad. States she focuses on her weight so much. In the morning how she feels about herself did not answer whole day. Overeats and is never not regretful of it. Has low self-esteem. 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 Walks a lot at her work and has to do a lot of physical work with her angers. When she comes back home at 6 that is when she is. Has been binge eating. States she lives with her son's father and her 3year-old son. They get along well. Mom watches her son when she is at work. Patient only taking Lexapro and Adderall right now. Supportive psychotherapy provided. We will try her on Latuda and Vyvanse to target bipolar depression, binge eating disorder.   We had patient assistance program on Latuda and Vyvanse if those are not approved by her insurance. We will also do her [employer] testing. Continue Lexapro for now. Patient has had passive suicidal ideations last time was 3 weeks ago. Supportive psychotherapy provided. She denies current suicidal ideation/homicidal ideations. Able to contract for safety. Crisis and other safety resources discussed with her. We will make a referral for counseling and weight loss program.  Mindfulness, motivation and self talk discussed with her. Patient Active Problem List   Diagnosis    Anxiety     LMP 10/09/22, Birth control,   Not pregnant    Denies palpitation,SOB, Chest pain, headaches. In no acute distress. MEDICATION REVIEW:    Current Medications:    Current Outpatient Medications   Medication Sig    amphetamine-dextroamphetamine (ADDERALL) 5 MG tablet Take 10 mg once in the morning and 5 mg in the afternoon. Please fill both prescriptions for 10 mg and 5 mg tablets. amphetamine-dextroamphetamine (ADDERALL) 10 MG tablet Take 10 mg once daily in the morning and 5 mg once daily in the afternoon. Please fill both prescriptions for 10 mg and 5 mg tablets.     escitalopram (LEXAPRO) 20 MG tablet Take 20 mg by mouth daily    cariprazine hcl (VRAYLAR) 1.5 MG capsule Take 1 capsule by mouth daily (Patient not taking: Reported on 10/11/2022)    Semaglutide-Weight Management (WEGOVY) 0.5 MG/0.5ML SOAJ SC injection Inject 0.5 mg into the skin every 7 days (Patient not taking: Reported on 10/11/2022)    Semaglutide-Weight Management (WEGOVY) 1.7 MG/0.75ML SOAJ SC injection Inject 1.7 mg into the skin every 7 days (Patient not taking: Reported on 10/11/2022)    Semaglutide-Weight Management (WEGOVY) 1 MG/0.5ML SOAJ SC injection Inject 1 mg into the skin every 7 days (Patient not taking: Reported on 10/11/2022)    Semaglutide-Weight Management (WEGOVY) 2.4 MG/0.75ML SOAJ SC injection Inject 2.4 mg into the skin every 7 days (Patient not taking: Reported on 10/11/2022)    ARIPiprazole (ABILIFY) 5 MG tablet Take 1 tablet by mouth in the morning. (Patient not taking: Reported on 10/11/2022)     No current facility-administered medications for this visit. Allergies   Allergen Reactions    Aspirin Itching       Past Medical History, Past Surgical History, Family history, Social History, and Medications were all reviewed with the patient today and updated as necessary.      Compliant with medication: Yes   Side effects from medications:  No     EXAMINATION  Musculoskeletal    GAIT AND STATION   [x] WNL   [] RESTRICTED   [] UNSTEADY WALK        [] ABNORMAL   [] UNBALANCED         PSYCHIATRIC     GENERAL APPEARANCE:   [x]  WELL GROOMED []     DISHEVELED   []  UNKEMPT      []  UNUSUAL/BIZZARE    [] WNL       ATTITUDE:   [x] COOPERATIVE   [] GUARDED   [] SUSPICIOUS      [] HOSTILE                            BEHAVIOR:   [x] CALM   [] HYPERACTIVE   [] MANNERISMS      [] BIZZARE         SPEECH:   [x] NORMAL FOR CLIENT   [] SPONTANEOUS   [] SLURRED   [] WHISPERING      [] LOUD   [] PRESSURED   [] ARTICULATE       EYE CONTACT:   [x] WNL   [] BLANK STARE   [] INTENSE      [] AVOIDANT         MOOD:   [] EUTHYMIC   [x] ANXIOUS   [x] DEPRESSED      [] IRRITABLE   [] ANGRY   [] APATHETIC     AFFECT:   [x] CONGRUENT WITH MOOD   [] FLAT   [] CONSTRICTED      [] INAPPROPRIATE   [] LABILE           THOUGHT PROCESS:   [x] LOGICAL/GOAL-DIRECTED   [] FOI   [] CIRCUMSANTIAL      [] INCOHERENT   [] TANGENTIAL   [] CONCRETE      [] PERSEVERATION           THOUGHT CONTENT:                DELUSIONS  [x] DENIES  [] GRANDIOSE  [] PERSECUTORY  [] Caodaism  [] REFERENCE   HALLUCINATIONS  [x] DENIES  [] AUDITORY  [] VISUAL  [] OLFACTORY  [] TACTILE     [] GUSTATORY  [] SOMATIC         OBSESSIONS  [x] DENIES  [] PRESENT         SUICIDAL IDEATION  [] DENIES  [x] PRESENT W/O PLAN  [] PRESENT W/ PLAN       HOMICIDAL IDEATION  [x] DENIES  [] PRESENT W/O PLAN  [] PRESENT W/ PLAN           JUDGEMENT:   [x] GOOD   [] FAIR   [] POOR   INSIGHT:   [x] GOOD [] FAIR   [] POOR     COGNITION:           SENSORIUM:   [x] ALERT   [] CLOUDED   [] DROWSY     ORIENTATION:   [x] INTACT   [] TIME:  PLACE  PERSON   RECENT & REMOTE MEMORY:   [] NORMAL   [x] OTHER:                  ATTENTION:   [] INTACT   [x] MILD IMPAIRMENT   [] SEVERE IMPAIRMENT     CONCENTRATION:   [] INTACT   [x] MILD IMPAIRMENT   [] SEVERE IMPAIRMENT     LANGUAGE:   [x] AVERAGE   [] ABOVE AVERAGE   [] BELOW AVERAGE     FUND OF KNOWLEDGE:   [] UNABLE TO ASSESS AT THIS TIME   [x] AVERAGE   [] ABOVE AVERAGE   [] BELOW AVERAGE      [] GOOD TO EXCELLENT KNOWLEDGE OF CURRENT EVENTS   [] POOR TO NO KNLEDGE OF CURRENT EVENTS           ABNORMAL MOVEMENTS:   [x] NONE   [] TICS   [] TREMORS   [] BIZZARE      [] FACE   [] TRUNK   [] EXTREMETIES   [] GESTURES        SLEEP:   [] GOOD   [] FAIR   [x] POOR      MUSCLE STRENGTH AND TONE   [x] WNL   [] ATROPHY   [] SPASTIC        [] FLACCID   [] COGWHEEL         Diagnoses/Impressions:    ICD-10-CM    1. Bipolar disorder, current episode depressed, severe, with psychotic features ([facility] [address]  F31.5       2. Obesity, Class III, BMI 40-49.9 (morbid obesity) ([facility])  E66.01       3. Binge eating disorder  F50.81       4. Attention deficit disorder, unspecified hyperactivity presence  F98.8       5. Generalized anxiety disorder with panic attacks  F41.1     F41.0       6.  Passive suicidal ideations  R45.851           TREATMENT GOALS:  Symptom reduction, Medication adherence, maintain therapeutic gains    LABS/IMAGING:    []  Ordered [x]  Reviewed []  New Labs Ordered:     LAB  WBC   Date/Time Value Ref Range Status   11/13/2020 07:14 PM 5.7 4.3 - 11.1 K/uL Final     Hemoglobin   Date/Time Value Ref Range Status   11/13/2020 07:14 PM 14.0 11.7 - 15.4 g/dL Final     Hematocrit   Date/Time Value Ref Range Status   11/13/2020 07:14 PM 41.5 35.8 - 46.3 % Final     Platelets   Date/Time Value Ref Range Status   11/13/2020 07:14  150 - 450 K/uL Final     Sodium   Date/Time Value Ref Range Status   11/13/2020 07:14  136 - 145 mmol/L Final     Potassium   Date/Time Value Ref Range Status   11/13/2020 07:14 PM 3.5 3.5 - 5.1 mmol/L Final     Chloride   Date/Time Value Ref Range Status   11/13/2020 07:14  98 - 107 mmol/L Final     CO2   Date/Time Value Ref Range Status   11/13/2020 07:14 PM 30 21 - 32 mmol/L Final     BUN   Date/Time Value Ref Range Status   11/13/2020 07:14 PM 7 6 - 23 MG/DL Final     ALT   Date/Time Value Ref Range Status   11/13/2020 07:14 PM 26 12 - 65 U/L Final     AST   Date/Time Value Ref Range Status   11/13/2020 07:14 PM 23 15 - 37 U/L Final       Please refer to the lab tab in the epic and care everywhere for the most recent lab results. Plan:     [x]  Medication ordered: Lexapro, Vyvanse, Latuda to target depression, anxiety, insomnia, mood stabilization, binge eating disorder. [x]  Medication education/counseling provided  Medication dosage and time to take, purpose/expected benefits/risks, common side effects, lab monitoring required and reason, expected length of treatment, risk of no treatment, effects on pregnancy/nursing, financial availability. Educated patient on  side effects/risks/benefits of meds including metabolic syndrome risk, EPS, increased risk of cerebrovascular accidents and mortality in elderly, akathisia,  cardiac arrhythmias, suicidal ideations, , orthostatic hypotension, serotonin syndrome, risk of alex/hypomania from antidepressants, withdrawals from abrupt discontinuation of meds,risk of bleeding, risk of seizures, high blood pressure, dizziness, drowsiness, sedation , risk of falls, Risk & benefits discussed: including but not limited to possible off-label medication usage.        [x] Follow MSE for sxs improvement     I have reviewed the patients controlled substance prescription history, as maintained in the [place] prescription monitoring program, so that the prescription(s) for a  controlled substance can be given.    Recommendations and Referrals: Follow up with : MD, requires monitoring of response to medication, requires monitoring of medication side effects. Time until next PMA:     Follow up with Mental Health Clinicians: psychotherapy interventions, improve level of functioning, monitoring to prevent decompensation /hospitalization, monitoring to maintain therapeutic gains, symptoms (resolving and controlled)           Psychotherapy note:                                __16_ Minutes of psychotherapy     [x]  Supportive psychotherapy, Patient discussed certain situational and personal stressors ongoing in her life at this time, weight management d/w the patient. Sleep hygiene d/w patient. Patient allowed to vent out her emotions. Scenarios were reviewed using role playing and CBT techniques in order to increase insight and decrease anxiety. []  Disposition planning      []  Dangerous and will not contract for safety in the community    **Pateint has been notified: They are to call 911 or go to their nearest E.R. if they are experiencing a medical emergency or suicidal ideations/homicidal ideations. **  All ancillary documentation entered reviewed by provider. PLEASE NOTE:  This document has been produced in part or whole using voice recognition software. Proofread however unrecognized errors in transcription may be present. Pedro Pablo Salmeron MD            Having bad mood swings for the last 3 weeks. Having some increased depression and finding it hard to get up and take showers and finding motivation to accomplish daily tasks. Also having increased binge eating. Has put on 12 lbs in the last 2 weeks.

## 2022-10-12 ENCOUNTER — TELEPHONE (OUTPATIENT)
Dept: BEHAVIORAL/MENTAL HEALTH CLINIC | Age: 22
End: 2022-10-12

## 2022-10-13 ENCOUNTER — TELEPHONE (OUTPATIENT)
Dept: BEHAVIORAL/MENTAL HEALTH CLINIC | Age: 22
End: 2022-10-13

## 2022-10-13 ENCOUNTER — PATIENT MESSAGE (OUTPATIENT)
Dept: BEHAVIORAL/MENTAL HEALTH CLINIC | Age: 22
End: 2022-10-13

## 2022-10-13 NOTE — TELEPHONE ENCOUNTER
----- Message from Perico Case sent at 10/13/2022 12:33 PM EDT -----  Regarding: Referral question   Hi I wanted to know if Dr Lora Blake could possibly get me a referral to see a Dr at a [address] to see if I could be a candidate to receive Bariatric surgery due to my weight and my binge eating disorder. She had mentioned sending me to see a nutritionist but I think that the Bariatric surgery is the route I need to go now.

## 2022-10-14 ENCOUNTER — TELEPHONE (OUTPATIENT)
Dept: BEHAVIORAL/MENTAL HEALTH CLINIC | Age: 22
End: 2022-10-14

## 2022-10-14 DIAGNOSIS — E66.01 OBESITY, CLASS III, BMI 40-49.9 (MORBID OBESITY) (HCC): Primary | ICD-10-CM

## 2022-10-14 DIAGNOSIS — F50.81 BINGE EATING DISORDER: ICD-10-CM

## 2022-10-14 NOTE — TELEPHONE ENCOUNTER
Patient has already been seen and prescribed latuda and vyvanse. Waiting on patient to come back by office to fill out patient assistance applications,.

## 2022-10-20 ENCOUNTER — TELEPHONE (OUTPATIENT)
Dept: BEHAVIORAL/MENTAL HEALTH CLINIC | Age: 22
End: 2022-10-20

## 2022-10-20 RX ORDER — LEVOMEFOLATE CALCIUM 7.5 MG TABLET
7.5 TABLET DAILY
Qty: 30 TABLET | Refills: 3 | Status: SHIPPED | OUTPATIENT
Start: 2022-10-20

## 2022-10-26 ENCOUNTER — TELEPHONE (OUTPATIENT)
Dept: BEHAVIORAL/MENTAL HEALTH CLINIC | Age: 22
End: 2022-10-26

## 2022-10-26 NOTE — TELEPHONE ENCOUNTER
Received the following message \"Hi I would like to be put back on Abilify but it needs a pre authorization. I had stopped taking it in September but Ive had a lot of issues since going off it. My binge eating is the worst its ever been and I did have it under control while taking the abilify. Also the Bariatric clinic got back to me and said they wont take me due to my binge eating dissorder. Im really struggling mentally at the moment. I went from 230 to 248 in a month and a half. I wanna get the abilify back because I know my insurance covers it. It just needs a prescribers authorization.  Thank you \"

## 2022-10-27 RX ORDER — ARIPIPRAZOLE 5 MG/1
5 TABLET ORAL DAILY
Qty: 30 TABLET | Refills: 2 | Status: SHIPPED | OUTPATIENT
Start: 2022-10-27 | End: 2022-11-22 | Stop reason: SDUPTHER

## 2022-11-08 ENCOUNTER — TELEPHONE (OUTPATIENT)
Dept: BEHAVIORAL/MENTAL HEALTH CLINIC | Age: 22
End: 2022-11-08

## 2022-11-08 DIAGNOSIS — F50.81 BINGE EATING DISORDER: ICD-10-CM

## 2022-11-08 DIAGNOSIS — F98.8 ATTENTION DEFICIT DISORDER, UNSPECIFIED HYPERACTIVITY PRESENCE: ICD-10-CM

## 2022-11-08 RX ORDER — DEXTROAMPHETAMINE SACCHARATE, AMPHETAMINE ASPARTATE, DEXTROAMPHETAMINE SULFATE AND AMPHETAMINE SULFATE 2.5; 2.5; 2.5; 2.5 MG/1; MG/1; MG/1; MG/1
TABLET ORAL
Qty: 30 TABLET | Refills: 0 | Status: SHIPPED | OUTPATIENT
Start: 2022-11-08 | End: 2022-12-08

## 2022-11-08 RX ORDER — DEXTROAMPHETAMINE SACCHARATE, AMPHETAMINE ASPARTATE, DEXTROAMPHETAMINE SULFATE AND AMPHETAMINE SULFATE 1.25; 1.25; 1.25; 1.25 MG/1; MG/1; MG/1; MG/1
TABLET ORAL
Qty: 30 TABLET | Refills: 0 | Status: SHIPPED | OUTPATIENT
Start: 2022-11-08 | End: 2022-12-08

## 2022-11-08 NOTE — TELEPHONE ENCOUNTER
Patient is applying for patient assistance for Vyvanse. She needs a refill for the adderall 10 and 5mg to last until this is processed.

## 2022-11-10 ENCOUNTER — TELEPHONE (OUTPATIENT)
Dept: BEHAVIORAL/MENTAL HEALTH CLINIC | Age: 22
End: 2022-11-10

## 2022-11-22 ENCOUNTER — OFFICE VISIT (OUTPATIENT)
Dept: BEHAVIORAL/MENTAL HEALTH CLINIC | Age: 22
End: 2022-11-22
Payer: MEDICAID

## 2022-11-22 VITALS
HEIGHT: 65 IN | DIASTOLIC BLOOD PRESSURE: 82 MMHG | TEMPERATURE: 98.1 F | BODY MASS INDEX: 40.48 KG/M2 | OXYGEN SATURATION: 99 % | WEIGHT: 243 LBS | HEART RATE: 98 BPM | SYSTOLIC BLOOD PRESSURE: 124 MMHG

## 2022-11-22 DIAGNOSIS — F41.0 GENERALIZED ANXIETY DISORDER WITH PANIC ATTACKS: ICD-10-CM

## 2022-11-22 DIAGNOSIS — F60.3 BORDERLINE PERSONALITY DISORDER (HCC): ICD-10-CM

## 2022-11-22 DIAGNOSIS — F41.1 GENERALIZED ANXIETY DISORDER WITH PANIC ATTACKS: ICD-10-CM

## 2022-11-22 DIAGNOSIS — F31.31 BIPOLAR AFFECTIVE DISORDER, CURRENTLY DEPRESSED, MILD (HCC): Primary | ICD-10-CM

## 2022-11-22 DIAGNOSIS — F51.01 PRIMARY INSOMNIA: ICD-10-CM

## 2022-11-22 DIAGNOSIS — F50.81 BINGE EATING DISORDER: ICD-10-CM

## 2022-11-22 PROCEDURE — 99214 OFFICE O/P EST MOD 30 MIN: CPT | Performed by: PSYCHIATRY & NEUROLOGY

## 2022-11-22 RX ORDER — TOPIRAMATE 50 MG/1
50 TABLET, FILM COATED ORAL 2 TIMES DAILY
Qty: 60 TABLET | Refills: 3 | Status: SHIPPED | OUTPATIENT
Start: 2022-11-22

## 2022-11-22 RX ORDER — ESCITALOPRAM OXALATE 20 MG/1
20 TABLET ORAL DAILY
Qty: 30 TABLET | Refills: 3 | Status: SHIPPED | OUTPATIENT
Start: 2022-11-22

## 2022-11-22 RX ORDER — ARIPIPRAZOLE 10 MG/1
10 TABLET ORAL DAILY
Qty: 30 TABLET | Refills: 3 | Status: SHIPPED | OUTPATIENT
Start: 2022-11-22

## 2022-11-22 RX ORDER — GABAPENTIN 300 MG/1
CAPSULE ORAL
Qty: 90 CAPSULE | Refills: 3 | Status: SHIPPED | OUTPATIENT
Start: 2022-11-22 | End: 2023-02-22

## 2022-11-22 ASSESSMENT — PATIENT HEALTH QUESTIONNAIRE - PHQ9
9. THOUGHTS THAT YOU WOULD BE BETTER OFF DEAD, OR OF HURTING YOURSELF: 0
8. MOVING OR SPEAKING SO SLOWLY THAT OTHER PEOPLE COULD HAVE NOTICED. OR THE OPPOSITE, BEING SO FIGETY OR RESTLESS THAT YOU HAVE BEEN MOVING AROUND A LOT MORE THAN USUAL: 0
SUM OF ALL RESPONSES TO PHQ QUESTIONS 1-9: 11
3. TROUBLE FALLING OR STAYING ASLEEP: 3
2. FEELING DOWN, DEPRESSED OR HOPELESS: 1
1. LITTLE INTEREST OR PLEASURE IN DOING THINGS: 0
10. IF YOU CHECKED OFF ANY PROBLEMS, HOW DIFFICULT HAVE THESE PROBLEMS MADE IT FOR YOU TO DO YOUR WORK, TAKE CARE OF THINGS AT HOME, OR GET ALONG WITH OTHER PEOPLE: 1
SUM OF ALL RESPONSES TO PHQ QUESTIONS 1-9: 11
7. TROUBLE CONCENTRATING ON THINGS, SUCH AS READING THE NEWSPAPER OR WATCHING TELEVISION: 2
5. POOR APPETITE OR OVEREATING: 2
4. FEELING TIRED OR HAVING LITTLE ENERGY: 3
6. FEELING BAD ABOUT YOURSELF - OR THAT YOU ARE A FAILURE OR HAVE LET YOURSELF OR YOUR FAMILY DOWN: 0
SUM OF ALL RESPONSES TO PHQ9 QUESTIONS 1 & 2: 1

## 2022-11-22 ASSESSMENT — ANXIETY QUESTIONNAIRES
5. BEING SO RESTLESS THAT IT IS HARD TO SIT STILL: 1
2. NOT BEING ABLE TO STOP OR CONTROL WORRYING: 1
1. FEELING NERVOUS, ANXIOUS, OR ON EDGE: 3
7. FEELING AFRAID AS IF SOMETHING AWFUL MIGHT HAPPEN: 2
3. WORRYING TOO MUCH ABOUT DIFFERENT THINGS: 1
6. BECOMING EASILY ANNOYED OR IRRITABLE: 3
GAD7 TOTAL SCORE: 14
IF YOU CHECKED OFF ANY PROBLEMS ON THIS QUESTIONNAIRE, HOW DIFFICULT HAVE THESE PROBLEMS MADE IT FOR YOU TO DO YOUR WORK, TAKE CARE OF THINGS AT HOME, OR GET ALONG WITH OTHER PEOPLE: SOMEWHAT DIFFICULT
4. TROUBLE RELAXING: 3

## 2022-11-22 NOTE — PROGRESS NOTES
Patient:  Fidel Escobar  Age:  23 y. o.  :  2000     SEX:  female MRN:  [id number]     RACE: White (non-)     SEEN:  [x]  PATIENT  []  SPOUSE []  OTHER:              PHQ-9  2022 10/11/2022 3/24/2022   Little interest or pleasure in doing things 0 3 -   Little interest or pleasure in doing things - - 2   Feeling down, depressed, or hopeless 1 2 -   Trouble falling or staying asleep, or sleeping too much 3 3 -   Trouble falling or staying asleep, or sleeping too much - - 1   Feeling tired or having little energy 3 3 -   Feeling tired or having little energy - - 2   Poor appetite or overeating 2 3 -   Poor appetite, weight loss, or overeating - - 3   Feeling bad about yourself - or that you are a failure or have let yourself or your family down 0 1 -   Feeling bad about yourself - or that you are a failure or have let yourself or your family down - - 2   Trouble concentrating on things, such as reading the newspaper or watching television 2 3 -   Trouble concentrating on things such as school, work, reading, or watching TV - - 1   Moving or speaking so slowly that other people could have noticed. Or the opposite - being so fidgety or restless that you have been moving around a lot more than usual 0 2 -   Moving or speaking so slowly that other people could have noticed; or the opposite being so fidgety that others notice - - 0   Thoughts that you would be better off dead, or of hurting yourself in some way 0 2 -   Thoughts of being better off dead, or hurting yourself in some way - - 1   PHQ-2 Score 1 5 -   Total Score PHQ 2 - - 2   PHQ-9 Total Score 11 22 -   PHQ 9 Score - - 12   If you checked off any problems, how difficult have these problems made it for you to do your work, take care of things at home, or get along with other people?  1 2 -   How difficult have these problems made it for you to do your work, take care of your home and get along with others - - Somewhat difficult REGINA-7 SCREENING 11/22/2022 10/11/2022 3/24/2022   Feeling nervous, anxious, or on edge Nearly every day Nearly every day -   Not being able to stop or control worrying Several days Nearly every day -   Worrying too much about different things Several days Nearly every day -   Trouble relaxing Nearly every day More than half the days -   Being so restless that it is hard to sit still Several days Not at all -   Becoming easily annoyed or irritable Nearly every day More than half the days -   Feeling afraid as if something awful might happen More than half the days Several days -   REGINA-7 Total Score 14 14 -   How difficult have these problems made it for you to do your work, take care of things at home, or get along with other people? Somewhat difficult Very difficult Very difficult   Feeling nervous, anxious, or on edge - - Not at all   REGINA-7 Total Score - - 17          Chief complaint:  Pt says she still anxious sometimes and has trouble sleeping. Subjective:  Seen for follow-up. States Abilify helps with depression. Gets anxious sometimes and verbally aggressive. Has trouble sleeping. Tries to distract herself. Her son is 3year-old. Has been taking Adderall for 2 years. Tried Latuda for 1 week but it made her sleepy. Advised her to take Latuda after supper that might help with the sleep. States has been cheating on her boyfriend. Impulsively sleeping around. It started this summer. So not a good terms with her boyfriend. When she cheats she completely separates her boyfriend from her mind. And then goes back on like nothing is happened. In looking for 40 years. Work is going good. Supportive psychotherapy provided. She denies suicidal ideation/homicidal ideations. Denies symptoms psychosis. We will up the dose of Abilify to 10 mg daily, continue Lexapro at the current dose, patient is calm and felt Vyvanse instead of Adderall tomorrow.   We will start her on Topamax and gabapentin as she reports mom and grandfather had bipolar disorder and mom takes Topamax and gabapentin. Supportive psychotherapy provided. Patient denies suicidal ideation/homicidal ideations. Denies symptoms of psychosis. Patient Active Problem List   Diagnosis    Anxiety     LMP 11/22/22, Birth control,   Not pregnant    Denies palpitation,SOB, Chest pain, headaches. In no acute distress. MEDICATION REVIEW:    Current Medications:    Current Outpatient Medications   Medication Sig    ARIPiprazole (ABILIFY) 10 MG tablet Take 1 tablet by mouth daily    escitalopram (LEXAPRO) 20 MG tablet Take 1 tablet by mouth daily    gabapentin (NEURONTIN) 300 MG capsule Take one in the afternoon and 2 at bedtime    topiramate (TOPAMAX) 50 MG tablet Take 1 tablet by mouth 2 times daily    amphetamine-dextroamphetamine (ADDERALL) 10 MG tablet Take 10 mg once daily in the morning and 5 mg once daily in the afternoon. Please fill both prescriptions for 10 mg and 5 mg tablets. amphetamine-dextroamphetamine (ADDERALL) 5 MG tablet Take 10 mg once in the morning and 5 mg in the afternoon. Please fill both prescriptions for 10 mg and 5 mg tablets. lisdexamfetamine (VYVANSE) 30 MG capsule Take 1 capsule by mouth every morning for 30 days.  (Patient not taking: Reported on 11/22/2022)    methylfolate (DEPLIN) 7.5 MG TABS tablet Take 1 tablet by mouth daily (Patient not taking: Reported on 11/22/2022)    Semaglutide-Weight Management (WEGOVY) 0.5 MG/0.5ML SOAJ SC injection Inject 0.5 mg into the skin every 7 days (Patient not taking: No sig reported)    Semaglutide-Weight Management (WEGOVY) 1.7 MG/0.75ML SOAJ SC injection Inject 1.7 mg into the skin every 7 days (Patient not taking: No sig reported)    Semaglutide-Weight Management (WEGOVY) 1 MG/0.5ML SOAJ SC injection Inject 1 mg into the skin every 7 days (Patient not taking: No sig reported)    Semaglutide-Weight Management (WEGOVY) 2.4 MG/0.75ML SOAJ SC injection Inject 2.4 mg into the skin every 7 days (Patient not taking: Reported on 11/22/2022)     No current facility-administered medications for this visit. Allergies   Allergen Reactions    Aspirin Itching       Past Medical History, Past Surgical History, Family history, Social History, and Medications were all reviewed with the patient today and updated as necessary.      Compliant with medication: Yes   Side effects from medications:  No     EXAMINATION  Musculoskeletal    GAIT AND STATION   [x] WNL   [] RESTRICTED   [] UNSTEADY WALK        [] ABNORMAL   [] UNBALANCED         PSYCHIATRIC     GENERAL APPEARANCE:   [x]  WELL GROOMED []     DISHEVELED   []  UNKEMPT      []  UNUSUAL/BIZZARE    [] WNL       ATTITUDE:   [x] COOPERATIVE   [] GUARDED   [] SUSPICIOUS      [] HOSTILE                            BEHAVIOR:   [x] CALM   [] HYPERACTIVE   [] MANNERISMS      [] BIZZARE         SPEECH:   [x] NORMAL FOR CLIENT   [] SPONTANEOUS   [] SLURRED   [] WHISPERING      [] LOUD   [] PRESSURED   [] ARTICULATE       EYE CONTACT:   [x] WNL   [] BLANK STARE   [] INTENSE      [] AVOIDANT         MOOD:   [] EUTHYMIC   [x] ANXIOUS   [] DEPRESSED      [] IRRITABLE   [] ANGRY   [] APATHETIC     AFFECT:   [x] CONGRUENT WITH MOOD   [] FLAT   [] CONSTRICTED      [] INAPPROPRIATE   [] LABILE           THOUGHT PROCESS:   [x] LOGICAL/GOAL-DIRECTED   [] FOI   [] CIRCUMSANTIAL      [] INCOHERENT   [] TANGENTIAL   [] CONCRETE      [] PERSEVERATION           THOUGHT CONTENT:                DELUSIONS  [x] DENIES  [] GRANDIOSE  [] PERSECUTORY  [] Sikh  [] REFERENCE   HALLUCINATIONS  [x] DENIES  [] AUDITORY  [] VISUAL  [] OLFACTORY  [] TACTILE     [] GUSTATORY  [] SOMATIC         OBSESSIONS  [x] DENIES  [] PRESENT         SUICIDAL IDEATION  [x] DENIES  [] PRESENT W/O PLAN  [] PRESENT W/ PLAN       HOMICIDAL IDEATION  [x] DENIES  [] PRESENT W/O PLAN  [] PRESENT W/ PLAN           JUDGEMENT:   [x] GOOD   [] FAIR   [] POOR   INSIGHT:   [x] GOOD   [] FAIR   [] POOR COGNITION:           SENSORIUM:   [x] ALERT   [] CLOUDED   [] DROWSY     ORIENTATION:   [x] INTACT   [] TIME:  PLACE  PERSON   RECENT & REMOTE MEMORY:   [x] NORMAL   [] OTHER:                  ATTENTION:   [x] INTACT   [] MILD IMPAIRMENT   [] SEVERE IMPAIRMENT     CONCENTRATION:   [] INTACT   [] MILD IMPAIRMENT   [] SEVERE IMPAIRMENT     LANGUAGE:   [] AVERAGE   [] ABOVE AVERAGE   [] BELOW AVERAGE     FUND OF KNOWLEDGE:   [] UNABLE TO ASSESS AT THIS TIME   [] AVERAGE   [] ABOVE AVERAGE   [] BELOW AVERAGE      [] GOOD TO EXCELLENT KNOWLEDGE OF CURRENT EVENTS   [] POOR TO NO KNLEDGE OF CURRENT EVENTS           ABNORMAL MOVEMENTS:   [x] NONE   [] TICS   [] TREMORS   [] BIZZARE      [] FACE   [] TRUNK   [] EXTREMETIES   [] GESTURES        SLEEP:   [x] GOOD   [] FAIR   [] POOR      MUSCLE STRENGTH AND TONE   [x] WNL   [] ATROPHY   [] SPASTIC        [] FLACCID   [] COGWHEEL         Diagnoses/Impressions:    ICD-10-CM    1. Bipolar affective disorder, currently depressed, mild ([facility]  F31.31       2. Generalized anxiety disorder with panic attacks  F41.1     F41.0       3. Binge eating disorder  F50.81       4. Primary insomnia  F51.01       5.  Borderline personality disorder ([facility]  F60.3           TREATMENT GOALS:  Symptom reduction, Medication adherence, maintain therapeutic gains    LABS/IMAGING:    []  Ordered [x]  Reviewed []  New Labs Ordered:     LAB  WBC   Date/Time Value Ref Range Status   11/13/2020 07:14 PM 5.7 4.3 - 11.1 K/uL Final     Hemoglobin   Date/Time Value Ref Range Status   11/13/2020 07:14 PM 14.0 11.7 - 15.4 g/dL Final     Hematocrit   Date/Time Value Ref Range Status   11/13/2020 07:14 PM 41.5 35.8 - 46.3 % Final     Platelets   Date/Time Value Ref Range Status   11/13/2020 07:14  150 - 450 K/uL Final     Sodium   Date/Time Value Ref Range Status   11/13/2020 07:14  136 - 145 mmol/L Final     Potassium   Date/Time Value Ref Range Status   11/13/2020 07:14 PM 3.5 3.5 - 5.1 mmol/L Final Chloride   Date/Time Value Ref Range Status   11/13/2020 07:14  98 - 107 mmol/L Final     CO2   Date/Time Value Ref Range Status   11/13/2020 07:14 PM 30 21 - 32 mmol/L Final     BUN   Date/Time Value Ref Range Status   11/13/2020 07:14 PM 7 6 - 23 MG/DL Final     ALT   Date/Time Value Ref Range Status   11/13/2020 07:14 PM 26 12 - 65 U/L Final     AST   Date/Time Value Ref Range Status   11/13/2020 07:14 PM 23 15 - 37 U/L Final       Please refer to the lab tab in the epic and care everywhere for the most recent lab results. Plan:     [x]  Medication ordered: Abilify, Lexapro, gabapentin, Topamax to target depression, anxiety, insomnia, mood stabilization. [x]  Medication education/counseling provided  Medication dosage and time to take, purpose/expected benefits/risks, common side effects, lab monitoring required and reason, expected length of treatment, risk of no treatment, effects on pregnancy/nursing, financial availability. Educated patient on  side effects/risks/benefits of meds including metabolic syndrome risk, EPS, increased risk of cerebrovascular accidents and mortality in elderly, akathisia,  cardiac arrhythmias, suicidal ideations, orthostatic hypotension, serotonin syndrome, risk of alex/hypomania from antidepressants, withdrawals from abrupt discontinuation of meds, risk of rash, risk of infection, risk of bleeding, risk of seizures, addiction potential, memory impairment , respiratory depression, high blood pressure, dizziness, drowsiness, sedation , risk of falls, Risk & benefits discussed: including but not limited to possible off-label medication usage. [x] Follow MSE for sxs improvement     I have reviewed the patients controlled substance prescription history, as maintained in the [place] prescription monitoring program, so that the prescription(s) for a  controlled substance can be given. Recommendations and Referrals:     Follow up with : MD, requires monitoring of response to medication, requires monitoring of medication side effects. Time until next PMA:     Follow up with Mental Health Clinicians: psychotherapy interventions, improve level of functioning, monitoring to prevent decompensation /hospitalization, monitoring to maintain therapeutic gains, symptoms (resolving and controlled)           Psychotherapy note:                                __10_ Minutes of psychotherapy     [x]  Supportive psychotherapy, Patient discussed certain situational and personal stressors ongoing in her life at this time, weight management d/w the patient. Sleep hygiene d/w patient. Patient allowed to vent out her emotions. Scenarios were reviewed using role playing and CBT techniques in order to increase insight and decrease anxiety. []  Disposition planning      []  Dangerous and will not contract for safety in the community    **Pateint has been notified: They are to call 911 or go to their nearest E.R. if they are experiencing a medical emergency or suicidal ideations/homicidal ideations. **  All ancillary documentation entered reviewed by provider. PLEASE NOTE:  This document has been produced in part or whole using voice recognition software. Proofread however unrecognized errors in transcription may be present. Amarilis Griggs MD            Has not yet started Vyvanse, waiting on the card to come in the mail. Provided phone number to call to check the status of this. Has not yet started L-methylfolate. Feels like she may have BPD and would like to discuss this to see if this is possibly what she has. Feels she has some of the symptoms.

## 2022-11-30 ENCOUNTER — TELEPHONE (OUTPATIENT)
Dept: BEHAVIORAL/MENTAL HEALTH CLINIC | Age: 22
End: 2022-11-30

## 2022-11-30 DIAGNOSIS — F98.8 ATTENTION DEFICIT DISORDER, UNSPECIFIED HYPERACTIVITY PRESENCE: ICD-10-CM

## 2022-11-30 DIAGNOSIS — F50.81 BINGE EATING DISORDER: ICD-10-CM

## 2022-11-30 NOTE — TELEPHONE ENCOUNTER
----- Message from Kobi Lange sent at 11/30/2022 12:35 PM EST -----  Regarding: Adderall question   Hi Dr Melissa Navarro recently started me on Vyvance and had me stop taking my adderall 15 mg. I just wanted to reach out because so far I have had felt no effect from the Vyvance at all and wanted to know if I could just continue the adderall instead. Thank you !

## 2022-12-07 NOTE — TELEPHONE ENCOUNTER
[facility] will only cover one 30 day fill per month. Prior Patito Foot has been resubmitted to insurance to see if we can possibly get it covered now that patient has been on the medication.

## 2022-12-09 NOTE — TELEPHONE ENCOUNTER
Prior auth through insurance for Vyvanse is still denied. I did contact [facility] to see if they would cover a partial prescription for titration dosing and they will not. They will only cover one full 30 day fill per month.

## 2023-01-19 ENCOUNTER — TELEMEDICINE (OUTPATIENT)
Dept: INTERNAL MEDICINE CLINIC | Facility: CLINIC | Age: 23
End: 2023-01-19
Payer: MEDICAID

## 2023-01-19 DIAGNOSIS — R53.83 OTHER FATIGUE: Primary | ICD-10-CM

## 2023-01-19 PROCEDURE — 99213 OFFICE O/P EST LOW 20 MIN: CPT | Performed by: NURSE PRACTITIONER

## 2023-01-19 ASSESSMENT — ENCOUNTER SYMPTOMS
ABDOMINAL PAIN: 0
COUGH: 0
WHEEZING: 0
NAUSEA: 0
SORE THROAT: 0
SHORTNESS OF BREATH: 0
DIARRHEA: 0
VOMITING: 0

## 2023-01-19 NOTE — PROGRESS NOTES
Mary Jose (:  2000) is a Established patient, here for evaluation of the following:    Assessment & Plan   Below is the assessment and plan developed based on review of pertinent history, physical exam, labs, studies, and medications. 1. Other fatigue  -     TSH with Reflex; Future  -     CBC with Auto Differential; Future  -     Comprehensive Metabolic Panel; Future  Fatigue due to mental health? Patient has been off of all ADHD medications for about one month- possibly contributing to recent worsening of fatigue? Will get labs. Plan pending results. Subjective   HPI  Patient seen virtually today with complaint of fatigue. Symptoms started about one year ago but have been worsening over the past month. She describes it as \"full-body fatigue\", reporting that her legs and eyes even feel heavy. Symptoms seem worse in the afternoon. History of bipolar affective disorder, anxiety, borderline personality and binge eating disorder--followed by Dr. Osiris Sherwood. Patient also with history of attention deficit disorder. She has previously been on Adderall which was switched to Vyvanse in November. Patient has not taken any medication for ADHD in over a month. She feels that she rests well at night. She does not exercise but has been trying to work on eating a healthy diet. She recently tried taking a vitamin B12 supplement but did not feel that it helped with her energy level. Family history of thyroid disorder in her mother. She would like to have some lab work done. Review of Systems   Constitutional:  Positive for fatigue. Negative for activity change, appetite change, chills, fever and unexpected weight change. HENT:  Negative for congestion, ear pain and sore throat. Respiratory:  Negative for cough, shortness of breath and wheezing. Cardiovascular:  Negative for chest pain and palpitations. Gastrointestinal:  Negative for abdominal pain, diarrhea, nausea and vomiting. Genitourinary:  Negative for dysuria, hematuria and menstrual problem. Neurological:  Negative for dizziness, light-headedness and headaches. Objective     Physical Exam  Constitutional:       General: She is not in acute distress. Appearance: Normal appearance. HENT:      Head: Normocephalic and atraumatic. Pulmonary:      Effort: No respiratory distress. Neurological:      Mental Status: She is alert and oriented to person, place, and time. Psychiatric:         Mood and Affect: Mood normal.            On this date 1/19/2023 I have spent 25 minutes reviewing previous notes, test results and face to face (virtual) with the patient discussing the diagnosis and importance of compliance with the treatment plan as well as documenting on the day of the visit. Theador Toby, was evaluated through a synchronous (real-time) audio-video encounter. The patient (or guardian if applicable) is aware that this is a billable service, which includes applicable co-pays. This Virtual Visit was conducted with patient's (and/or legal guardian's) consent. The visit was conducted pursuant to the emergency declaration under the [address] [place] [address], [address] [place] [address] authority and the [employer] General Act. Patient identification was verified, and a caregiver was present when appropriate. The patient was located at Home: [place] [id number]. Provider was located at [facility] (Appt Dept): [address],  [address].         --HAMLET Duke - CNP

## 2023-01-24 DIAGNOSIS — R53.83 OTHER FATIGUE: ICD-10-CM

## 2023-01-25 LAB
ALBUMIN SERPL-MCNC: 3.8 G/DL (ref 3.5–5)
ALBUMIN/GLOB SERPL: 1 (ref 0.4–1.6)
ALP SERPL-CCNC: 79 U/L (ref 50–136)
ALT SERPL-CCNC: 27 U/L (ref 12–65)
ANION GAP SERPL CALC-SCNC: 5 MMOL/L (ref 2–11)
AST SERPL-CCNC: 13 U/L (ref 15–37)
BASOPHILS # BLD: 0.1 K/UL (ref 0–0.2)
BASOPHILS NFR BLD: 1 % (ref 0–2)
BILIRUB SERPL-MCNC: 0.4 MG/DL (ref 0.2–1.1)
BUN SERPL-MCNC: 12 MG/DL (ref 6–23)
CALCIUM SERPL-MCNC: 9.5 MG/DL (ref 8.3–10.4)
CHLORIDE SERPL-SCNC: 108 MMOL/L (ref 101–110)
CO2 SERPL-SCNC: 29 MMOL/L (ref 21–32)
CREAT SERPL-MCNC: 0.8 MG/DL (ref 0.6–1)
DIFFERENTIAL METHOD BLD: ABNORMAL
EOSINOPHIL # BLD: 0.3 K/UL (ref 0–0.8)
EOSINOPHIL NFR BLD: 5 % (ref 0.5–7.8)
ERYTHROCYTE [DISTWIDTH] IN BLOOD BY AUTOMATED COUNT: 13.2 % (ref 11.9–14.6)
GLOBULIN SER CALC-MCNC: 3.9 G/DL (ref 2.8–4.5)
GLUCOSE SERPL-MCNC: 100 MG/DL (ref 65–100)
HCT VFR BLD AUTO: 40.5 % (ref 35.8–46.3)
HGB BLD-MCNC: 12.8 G/DL (ref 11.7–15.4)
IMM GRANULOCYTES # BLD AUTO: 0 K/UL (ref 0–0.5)
IMM GRANULOCYTES NFR BLD AUTO: 0 % (ref 0–5)
LYMPHOCYTES # BLD: 1.9 K/UL (ref 0.5–4.6)
LYMPHOCYTES NFR BLD: 33 % (ref 13–44)
MCH RBC QN AUTO: 29.1 PG (ref 26.1–32.9)
MCHC RBC AUTO-ENTMCNC: 31.6 G/DL (ref 31.4–35)
MCV RBC AUTO: 92 FL (ref 82–102)
MONOCYTES # BLD: 0.5 K/UL (ref 0.1–1.3)
MONOCYTES NFR BLD: 8 % (ref 4–12)
NEUTS SEG # BLD: 3.1 K/UL (ref 1.7–8.2)
NEUTS SEG NFR BLD: 53 % (ref 43–78)
NRBC # BLD: 0 K/UL (ref 0–0.2)
PLATELET # BLD AUTO: 351 K/UL (ref 150–450)
PMV BLD AUTO: 9.3 FL (ref 9.4–12.3)
POTASSIUM SERPL-SCNC: 4.4 MMOL/L (ref 3.5–5.1)
PROT SERPL-MCNC: 7.7 G/DL (ref 6.3–8.2)
RBC # BLD AUTO: 4.4 M/UL (ref 4.05–5.2)
SODIUM SERPL-SCNC: 142 MMOL/L (ref 133–143)
TSH W FREE THYROID IF ABNORMAL: 1.45 UIU/ML (ref 0.36–3.74)
WBC # BLD AUTO: 5.8 K/UL (ref 4.3–11.1)

## 2023-02-06 ENCOUNTER — TELEMEDICINE (OUTPATIENT)
Dept: BEHAVIORAL/MENTAL HEALTH CLINIC | Age: 23
End: 2023-02-06

## 2023-02-06 DIAGNOSIS — F60.3 BORDERLINE PERSONALITY DISORDER (HCC): ICD-10-CM

## 2023-02-06 DIAGNOSIS — F50.81 BINGE EATING DISORDER: ICD-10-CM

## 2023-02-06 DIAGNOSIS — F31.32 BIPOLAR AFFECTIVE DISORDER, CURRENTLY DEPRESSED, MODERATE (HCC): Primary | ICD-10-CM

## 2023-02-06 DIAGNOSIS — F51.01 PRIMARY INSOMNIA: ICD-10-CM

## 2023-02-06 DIAGNOSIS — F41.0 GENERALIZED ANXIETY DISORDER WITH PANIC ATTACKS: ICD-10-CM

## 2023-02-06 DIAGNOSIS — F41.1 GENERALIZED ANXIETY DISORDER WITH PANIC ATTACKS: ICD-10-CM

## 2023-02-06 RX ORDER — TOPIRAMATE 100 MG/1
TABLET, FILM COATED ORAL
Qty: 60 TABLET | Refills: 3 | Status: SHIPPED | OUTPATIENT
Start: 2023-02-06

## 2023-02-06 RX ORDER — DEXTROAMPHETAMINE SACCHARATE, AMPHETAMINE ASPARTATE, DEXTROAMPHETAMINE SULFATE AND AMPHETAMINE SULFATE 2.5; 2.5; 2.5; 2.5 MG/1; MG/1; MG/1; MG/1
10 TABLET ORAL DAILY
Qty: 30 TABLET | Refills: 0 | Status: SHIPPED | OUTPATIENT
Start: 2023-04-07 | End: 2023-02-09 | Stop reason: SDUPTHER

## 2023-02-06 RX ORDER — DEXTROAMPHETAMINE SACCHARATE, AMPHETAMINE ASPARTATE, DEXTROAMPHETAMINE SULFATE AND AMPHETAMINE SULFATE 2.5; 2.5; 2.5; 2.5 MG/1; MG/1; MG/1; MG/1
10 TABLET ORAL DAILY
Qty: 30 TABLET | Refills: 0 | Status: SHIPPED | OUTPATIENT
Start: 2023-03-08 | End: 2023-02-06 | Stop reason: SDUPTHER

## 2023-02-06 RX ORDER — DEXTROAMPHETAMINE SACCHARATE, AMPHETAMINE ASPARTATE, DEXTROAMPHETAMINE SULFATE AND AMPHETAMINE SULFATE 2.5; 2.5; 2.5; 2.5 MG/1; MG/1; MG/1; MG/1
10 TABLET ORAL DAILY
Qty: 30 TABLET | Refills: 0 | Status: SHIPPED | OUTPATIENT
Start: 2023-02-06 | End: 2023-02-06 | Stop reason: SDUPTHER

## 2023-02-06 RX ORDER — ARIPIPRAZOLE 10 MG/1
10 TABLET ORAL DAILY
Qty: 30 TABLET | Refills: 3 | Status: SHIPPED | OUTPATIENT
Start: 2023-02-06

## 2023-02-06 RX ORDER — GABAPENTIN 300 MG/1
300-600 CAPSULE ORAL NIGHTLY
Qty: 60 CAPSULE | Refills: 3 | Status: SHIPPED | OUTPATIENT
Start: 2023-02-06 | End: 2023-06-06

## 2023-02-06 RX ORDER — ESCITALOPRAM OXALATE 20 MG/1
20 TABLET ORAL DAILY
Qty: 30 TABLET | Refills: 3 | Status: SHIPPED | OUTPATIENT
Start: 2023-02-06

## 2023-02-06 ASSESSMENT — ANXIETY QUESTIONNAIRES
7. FEELING AFRAID AS IF SOMETHING AWFUL MIGHT HAPPEN: 2
GAD7 TOTAL SCORE: 19
3. WORRYING TOO MUCH ABOUT DIFFERENT THINGS: 3
2. NOT BEING ABLE TO STOP OR CONTROL WORRYING: 3
1. FEELING NERVOUS, ANXIOUS, OR ON EDGE: 3
6. BECOMING EASILY ANNOYED OR IRRITABLE: 2
4. TROUBLE RELAXING: 3
IF YOU CHECKED OFF ANY PROBLEMS ON THIS QUESTIONNAIRE, HOW DIFFICULT HAVE THESE PROBLEMS MADE IT FOR YOU TO DO YOUR WORK, TAKE CARE OF THINGS AT HOME, OR GET ALONG WITH OTHER PEOPLE: VERY DIFFICULT
5. BEING SO RESTLESS THAT IT IS HARD TO SIT STILL: 3

## 2023-02-06 ASSESSMENT — PATIENT HEALTH QUESTIONNAIRE - PHQ9
SUM OF ALL RESPONSES TO PHQ9 QUESTIONS 1 & 2: 0
6. FEELING BAD ABOUT YOURSELF - OR THAT YOU ARE A FAILURE OR HAVE LET YOURSELF OR YOUR FAMILY DOWN: 3
10. IF YOU CHECKED OFF ANY PROBLEMS, HOW DIFFICULT HAVE THESE PROBLEMS MADE IT FOR YOU TO DO YOUR WORK, TAKE CARE OF THINGS AT HOME, OR GET ALONG WITH OTHER PEOPLE: 2
SUM OF ALL RESPONSES TO PHQ QUESTIONS 1-9: 15
5. POOR APPETITE OR OVEREATING: 3
3. TROUBLE FALLING OR STAYING ASLEEP: 3
9. THOUGHTS THAT YOU WOULD BE BETTER OFF DEAD, OR OF HURTING YOURSELF: 0
7. TROUBLE CONCENTRATING ON THINGS, SUCH AS READING THE NEWSPAPER OR WATCHING TELEVISION: 3
SUM OF ALL RESPONSES TO PHQ QUESTIONS 1-9: 15
8. MOVING OR SPEAKING SO SLOWLY THAT OTHER PEOPLE COULD HAVE NOTICED. OR THE OPPOSITE, BEING SO FIGETY OR RESTLESS THAT YOU HAVE BEEN MOVING AROUND A LOT MORE THAN USUAL: 0
2. FEELING DOWN, DEPRESSED OR HOPELESS: 0
1. LITTLE INTEREST OR PLEASURE IN DOING THINGS: 0
SUM OF ALL RESPONSES TO PHQ QUESTIONS 1-9: 15
4. FEELING TIRED OR HAVING LITTLE ENERGY: 3
SUM OF ALL RESPONSES TO PHQ QUESTIONS 1-9: 15

## 2023-02-06 NOTE — PROGRESS NOTES
Patient:  Criss Griffiths  Age:  23 y. o.  :  2000     SEX:  female MRN:  [id number]     RACE: White (non-)     SEEN:  [x]  PATIENT  []  SPOUSE []  OTHER:              PHQ-9  2023 2022 10/11/2022   Little interest or pleasure in doing things 0 0 3   Little interest or pleasure in doing things - - -   Feeling down, depressed, or hopeless 0 1 2   Trouble falling or staying asleep, or sleeping too much 3 3 3   Trouble falling or staying asleep, or sleeping too much - - -   Feeling tired or having little energy 3 3 3   Feeling tired or having little energy - - -   Poor appetite or overeating 3 2 3   Poor appetite, weight loss, or overeating - - -   Feeling bad about yourself - or that you are a failure or have let yourself or your family down 3 0 1   Feeling bad about yourself - or that you are a failure or have let yourself or your family down - - -   Trouble concentrating on things, such as reading the newspaper or watching television 3 2 3   Trouble concentrating on things such as school, work, reading, or watching TV - - -   Moving or speaking so slowly that other people could have noticed. Or the opposite - being so fidgety or restless that you have been moving around a lot more than usual 0 0 2   Moving or speaking so slowly that other people could have noticed; or the opposite being so fidgety that others notice - - -   Thoughts that you would be better off dead, or of hurting yourself in some way 0 0 2   Thoughts of being better off dead, or hurting yourself in some way - - -   PHQ-2 Score 0 1 5   Total Score PHQ 2 - - -   PHQ-9 Total Score 15 11 22   PHQ 9 Score - - -   If you checked off any problems, how difficult have these problems made it for you to do your work, take care of things at home, or get along with other people?  2 1 2   How difficult have these problems made it for you to do your work, take care of your home and get along with others - - -       REG[place]-7 SCREENING 2/6/2023 11/22/2022 10/11/2022   Feeling nervous, anxious, or on edge Nearly every day Nearly every day Nearly every day   Not being able to stop or control worrying Nearly every day Several days Nearly every day   Worrying too much about different things Nearly every day Several days Nearly every day   Trouble relaxing Nearly every day Nearly every day More than half the days   Being so restless that it is hard to sit still Nearly every day Several days Not at all   Becoming easily annoyed or irritable More than half the days Nearly every day More than half the days   Feeling afraid as if something awful might happen More than half the days More than half the days Several days   REG[place]-7 Total Score 19 14 14   How difficult have these problems made it for you to do your work, take care of things at home, or get along with other people? Very difficult Somewhat difficult Very difficult   Feeling nervous, anxious, or on edge - - -   REG[place]-7 Total Score - - -        I was at home while conducting this encounter. Consent:  She and/or her healthcare decision maker is aware that this patient-initiated Telehealth encounter is a billable service, with coverage as determined by her insurance carrier. She is aware that she may receive a bill and has provided verbal consent to proceed: YesPatient identification was verified, and a caregiver was present when appropriate. The patient was located in a state where the provider was credentialed to provide care. This virtual visit was conducted via [address] Pursuant to the emergency declaration under the [facility] waiver authority and the [employer] General Act, this Virtual  Visit was conducted to reduce the patient's risk of exposure to COVID-19 and provide continuity of care for an established patient.  Services were provided through a video synchronous discussion virtually to substitute for in-person clinic visit. Due to this being a TeleHealth evaluation, many elements of the physical examination are unable to be assessed. Total Time: minutes: 11-20 minutes. Chief complaint:  Pt says she is not depressed but her anxiety is bad. Subjective:  Seen virtually for follow-up. States not depressed but anxiety is bad. Feels like crawling out of her skin. This has been going on for a couple of months. Stop Vyvanse because her anxiety was worse on it. Did not take gabapentin because it made her sleepy. She has not been sleeping well. It is hard to fall asleep when she is up until 3:57 in the morning. Advised to take gabapentin at bedtime to help her sleep. She has been taking Lexapro and Abilify but not regularly. Off of Abilify for 5 days and has not picked up from the pharmacy. Still with her boyfriend. Not cheating on him anymore. Son doing well. He stays with her mother when she works from 3-11. Supportive psychotherapy provided. She wants to go back on the Adderall. We will start her on Adderall. Advised to take gabapentin at night and Topamax titrate the dose up to 100 mg twice a day. States she took Topamax but it was not helping so stopped taking it. Compliance emphasized. Supportive psychotherapy provided. She denies suicidal ideation/homicidal ideations. Denies symptoms of psychosis. Patient Active Problem List   Diagnosis    Anxiety     LMP 1/13/2023, Birth control,   Not pregnant    Denies palpitation,SOB, Chest pain, headaches. In no acute distress. MEDICATION REVIEW:    Current Medications:    Current Outpatient Medications   Medication Sig    gabapentin (NEURONTIN) 300 MG capsule Take 1-2 capsules by mouth at bedtime for 120 days.     ARIPiprazole (ABILIFY) 10 MG tablet Take 1 tablet by mouth daily    escitalopram (LEXAPRO) 20 MG tablet Take 1 tablet by mouth daily    topiramate (TOPAMAX) 100 MG tablet Take 50 mg twice daily for 1 week then 100 mg twice daily    amphetamine-dextroamphetamine (ADDERALL, 10MG,) 10 MG tablet Take 1 tablet by mouth daily for 30 days. Max Daily Amount: 10 mg    topiramate (TOPAMAX) 50 MG tablet Take 1 tablet by mouth 2 times daily (Patient not taking: Reported on 2/6/2023)     No current facility-administered medications for this visit. Allergies   Allergen Reactions    Aspirin Itching       Past Medical History, Past Surgical History, Family history, Social History, and Medications were all reviewed with the patient today and updated as necessary.      Compliant with medication: Yes   Side effects from medications:  No     EXAMINATION  Musculoskeletal    GAIT AND STATION   [x] WNL   [] RESTRICTED   [] UNSTEADY WALK        [] ABNORMAL   [] UNBALANCED         PSYCHIATRIC     GENERAL APPEARANCE:   [x]  WELL GROOMED []     DISHEVELED   []  UNKEMPT      []  UNUSUAL/BIZZARE    [] WNL       ATTITUDE:   [x] COOPERATIVE   [] GUARDED   [] SUSPICIOUS      [] HOSTILE                            BEHAVIOR:   [x] CALM   [] HYPERACTIVE   [] MANNERISMS      [] BIZZARE         SPEECH:   [x] NORMAL FOR CLIENT   [] SPONTANEOUS   [] SLURRED   [] WHISPERING      [] LOUD   [] PRESSURED   [] ARTICULATE       EYE CONTACT:   [x] WNL   [] BLANK STARE   [] INTENSE      [] AVOIDANT         MOOD:   [] EUTHYMIC   [x] ANXIOUS   [] DEPRESSED      [] IRRITABLE   [] ANGRY   [] APATHETIC     AFFECT:   [x] CONGRUENT WITH MOOD   [] FLAT   [] CONSTRICTED      [] INAPPROPRIATE   [] LABILE           THOUGHT PROCESS:   [x] LOGICAL/GOAL-DIRECTED   [] FOI   [] CIRCUMSANTIAL      [] INCOHERENT   [] TANGENTIAL   [] CONCRETE      [] PERSEVERATION           THOUGHT CONTENT:                DELUSIONS  [x] DENIES  [] GRANDIOSE  [] PERSECUTORY  [] Sabianism  [] REFERENCE   HALLUCINATIONS  [x] DENIES  [] AUDITORY  [] VISUAL  [] OLFACTORY  [] TACTILE     [] GUSTATORY  [] SOMATIC         OBSESSIONS  [x] DENIES  [] PRESENT         SUICIDAL IDEATION  [x] DENIES  [] PRESENT W/O PLAN  [] PRESENT W/ PLAN       HOMICIDAL IDEATION  [x] DENIES  [] PRESENT W/O PLAN  [] PRESENT W/ PLAN           JUDGEMENT:   [x] GOOD   [] FAIR   [] POOR   INSIGHT:   [x] GOOD   [] FAIR   [] POOR     COGNITION:           SENSORIUM:   [x] ALERT   [] CLOUDED   [] DROWSY     ORIENTATION:   [x] INTACT   [] TIME:  PLACE  PERSON   RECENT & REMOTE MEMORY:   [x] NORMAL   [] OTHER:                  ATTENTION:   [x] INTACT   [] MILD IMPAIRMENT   [] SEVERE IMPAIRMENT     CONCENTRATION:   [x] INTACT   [] MILD IMPAIRMENT   [] SEVERE IMPAIRMENT     LANGUAGE:   [x] AVERAGE   [] ABOVE AVERAGE   [] BELOW AVERAGE     FUND OF KNOWLEDGE:   [] UNABLE TO ASSESS AT THIS TIME   [x] AVERAGE   [] ABOVE AVERAGE   [] BELOW AVERAGE      [] GOOD TO EXCELLENT KNOWLEDGE OF CURRENT EVENTS   [] POOR TO NO KNLEDGE OF CURRENT EVENTS           ABNORMAL MOVEMENTS:   [x] NONE   [] TICS   [] TREMORS   [] BIZZARE      [] FACE   [] TRUNK   [] EXTREMETIES   [] GESTURES        SLEEP:   [] GOOD   [] FAIR   [x] POOR      MUSCLE STRENGTH AND TONE   [] WNL   [] ATROPHY   [] SPASTIC        [] FLACCID   [] COGWHEEL         Diagnoses/Impressions:    ICD-10-CM    1. Bipolar affective disorder, currently depressed, moderate ([facility]  F31.32       2. Binge eating disorder  F50.81 amphetamine-dextroamphetamine (ADDERALL, 10MG,) 10 MG tablet      3. Generalized anxiety disorder with panic attacks  F41.1     F41.0       4. Primary insomnia  F51.01       5.  Borderline personality disorder ([facility]  F60.3           TREATMENT GOALS:  Symptom reduction, Medication adherence, maintain therapeutic gains    LABS/IMAGING:    []  Ordered [x]  Reviewed []  New Labs Ordered:     LAB  WBC   Date/Time Value Ref Range Status   01/24/2023 12:37 PM 5.8 4.3 - 11.1 K/uL Final     Hemoglobin   Date/Time Value Ref Range Status   01/24/2023 12:37 PM 12.8 11.7 - 15.4 g/dL Final     Hematocrit   Date/Time Value Ref Range Status   01/24/2023 12:37 PM 40.5 35.8 - 46.3 % Final     Platelets Date/Time Value Ref Range Status   01/24/2023 12:37  150 - 450 K/uL Final     Sodium   Date/Time Value Ref Range Status   01/24/2023 12:37  133 - 143 mmol/L Final     Potassium   Date/Time Value Ref Range Status   01/24/2023 12:37 PM 4.4 3.5 - 5.1 mmol/L Final     Chloride   Date/Time Value Ref Range Status   01/24/2023 12:37  101 - 110 mmol/L Final     CO2   Date/Time Value Ref Range Status   01/24/2023 12:37 PM 29 21 - 32 mmol/L Final     BUN   Date/Time Value Ref Range Status   01/24/2023 12:37 PM 12 6 - 23 MG/DL Final     ALT   Date/Time Value Ref Range Status   01/24/2023 12:37 PM 27 12 - 65 U/L Final     AST   Date/Time Value Ref Range Status   01/24/2023 12:37 PM 13 (L) 15 - 37 U/L Final       Please refer to the lab tab in the epic and care everywhere for the most recent lab results. Plan:     [x]  Medication ordered: Gabapentin, Topamax, Lexapro, Abilify, Adderall to target depression, anxiety, insomnia, binge eating disorder. [x]  Medication education/counseling provided  Medication dosage and time to take, purpose/expected benefits/risks, common side effects, lab monitoring required and reason, expected length of treatment, risk of no treatment, effects on pregnancy/nursing, financial availability discussed. Educated patient on  side effects/risks/benefits of meds including metabolic syndrome risk, EPS, increased risk of cerebrovascular accidents and mortality in elderly, akathisia,  cardiac arrhythmias, suicidal ideations, orthostatic hypotension, serotonin syndrome, risk of alex/hypomania from antidepressants, withdrawals from abrupt discontinuation of meds, risk of rash, risk of infection, risk of bleeding, risk of seizures, addiction potential,  high blood pressure, dizziness, drowsiness, sedation , risk of falls, Risk & benefits discussed: including but not limited to possible off-label medication usage.        [x] Follow MSE for sxs improvement     I have reviewed the patients controlled substance prescription history, as maintained in the [place] prescription monitoring program, so that the prescription(s) for a  controlled substance can be given. Recommendations and Referrals: Follow up with : MD, requires monitoring of response to medication, requires monitoring of medication side effects. Time until next PMA:     Follow up with Mental Health Clinicians: psychotherapy interventions, improve level of functioning, monitoring to prevent decompensation /hospitalization, monitoring to maintain therapeutic gains, symptoms (resolving and controlled)           Psychotherapy note:                                __10_ Minutes of psychotherapy     [x]  Supportive psychotherapy, Patient discussed certain situational and personal stressors ongoing in her life at this time, weight management d/w the patient. Sleep hygiene d/w patient. Patient allowed to vent out her emotions. Scenarios were reviewed using role playing and CBT techniques in order to increase insight and decrease anxiety. []  Disposition planning      []  Dangerous and will not contract for safety in the community    **Pateint has been notified: They are to call 911 or go to their nearest E.R. if they are experiencing a medical emergency or suicidal ideations/homicidal ideations. **  All ancillary documentation entered reviewed by provider. PLEASE NOTE:  This document has been produced in part or whole using voice recognition software. Proofread however unrecognized errors in transcription may be present.         Flaco Red MD

## 2023-02-08 ENCOUNTER — TELEPHONE (OUTPATIENT)
Dept: PRIMARY CARE CLINIC | Facility: CLINIC | Age: 23
End: 2023-02-08

## 2023-02-08 NOTE — TELEPHONE ENCOUNTER
Patient called because [facility] has a back order of medication adderall and patient request to sent her medication to a new pharmacy.     [facility] on [address]  Phone :[phone]    Thank you

## 2023-02-09 DIAGNOSIS — F50.81 BINGE EATING DISORDER: ICD-10-CM

## 2023-02-09 RX ORDER — DEXTROAMPHETAMINE SACCHARATE, AMPHETAMINE ASPARTATE, DEXTROAMPHETAMINE SULFATE AND AMPHETAMINE SULFATE 2.5; 2.5; 2.5; 2.5 MG/1; MG/1; MG/1; MG/1
10 TABLET ORAL DAILY
Qty: 30 TABLET | Refills: 0 | Status: SHIPPED | OUTPATIENT
Start: 2023-04-07 | End: 2023-05-07

## 2023-02-27 ENCOUNTER — TELEPHONE (OUTPATIENT)
Dept: PRIMARY CARE CLINIC | Facility: CLINIC | Age: 23
End: 2023-02-27

## 2023-02-27 NOTE — TELEPHONE ENCOUNTER
Patient needs refill sent to the new pharmacy for current fill date. Her regular pharmacy does not have it in stock. Needs one sent to [facility] for Adderall 10mg. Patient is completely out of Adderall and has not been able to get it since December. Will need enough to last until 5/4.

## 2023-02-27 NOTE — TELEPHONE ENCOUNTER
Pt prescription was send with wrong dates, it needs to be ready for this month, prescription have April month, please call pharmacy to clarify, thank you.

## 2023-02-28 DIAGNOSIS — F50.81 BINGE EATING DISORDER: ICD-10-CM

## 2023-02-28 RX ORDER — DEXTROAMPHETAMINE SACCHARATE, AMPHETAMINE ASPARTATE, DEXTROAMPHETAMINE SULFATE AND AMPHETAMINE SULFATE 2.5; 2.5; 2.5; 2.5 MG/1; MG/1; MG/1; MG/1
10 TABLET ORAL DAILY
Qty: 30 TABLET | Refills: 0 | Status: SHIPPED | OUTPATIENT
Start: 2023-02-28 | End: 2023-02-28 | Stop reason: SDUPTHER

## 2023-02-28 RX ORDER — DEXTROAMPHETAMINE SACCHARATE, AMPHETAMINE ASPARTATE, DEXTROAMPHETAMINE SULFATE AND AMPHETAMINE SULFATE 2.5; 2.5; 2.5; 2.5 MG/1; MG/1; MG/1; MG/1
10 TABLET ORAL DAILY
Qty: 30 TABLET | Refills: 0 | Status: SHIPPED | OUTPATIENT
Start: 2023-03-30 | End: 2023-02-28 | Stop reason: SDUPTHER

## 2023-02-28 RX ORDER — DEXTROAMPHETAMINE SACCHARATE, AMPHETAMINE ASPARTATE, DEXTROAMPHETAMINE SULFATE AND AMPHETAMINE SULFATE 2.5; 2.5; 2.5; 2.5 MG/1; MG/1; MG/1; MG/1
10 TABLET ORAL DAILY
Qty: 30 TABLET | Refills: 0 | Status: SHIPPED | OUTPATIENT
Start: 2023-04-29 | End: 2023-05-29

## 2023-03-28 ENCOUNTER — TELEPHONE (OUTPATIENT)
Dept: PRIMARY CARE CLINIC | Facility: CLINIC | Age: 23
End: 2023-03-28

## 2023-04-19 ENCOUNTER — TELEPHONE (OUTPATIENT)
Dept: PRIMARY CARE CLINIC | Facility: CLINIC | Age: 23
End: 2023-04-19

## 2023-04-19 NOTE — TELEPHONE ENCOUNTER
Patient stated that she is having anxiety. Quit her job of 5 years and does have a job lined up. Feeling paranoid and scared. Having a hard time sitting down and resting and anxious all the time. Started a couple months ago but has gotten worse over the last month. Requesting something to help with this. Denies SI/HI. Next appt is 5/4/23.

## 2023-04-19 NOTE — TELEPHONE ENCOUNTER
Pt requesting medication for anxiety please call pt back thank you. Ear Star Wedge Flap Text: The defect edges were debeveled with a #15 blade scalpel.  Given the location of the defect and the proximity to free margins (helical rim) an ear star wedge flap was deemed most appropriate.  Using a sterile surgical marker, the appropriate flap was drawn incorporating the defect and placing the expected incisions between the helical rim and antihelix where possible.  The area thus outlined was incised through and through with a #15 scalpel blade.

## 2023-04-20 ENCOUNTER — TELEMEDICINE (OUTPATIENT)
Dept: BEHAVIORAL/MENTAL HEALTH CLINIC | Age: 23
End: 2023-04-20
Payer: MEDICAID

## 2023-04-20 DIAGNOSIS — F41.1 GENERALIZED ANXIETY DISORDER WITH PANIC ATTACKS: ICD-10-CM

## 2023-04-20 DIAGNOSIS — F51.01 PRIMARY INSOMNIA: ICD-10-CM

## 2023-04-20 DIAGNOSIS — F31.32 BIPOLAR AFFECTIVE DISORDER, CURRENTLY DEPRESSED, MODERATE (HCC): Primary | ICD-10-CM

## 2023-04-20 DIAGNOSIS — E66.01 OBESITY, CLASS III, BMI 40-49.9 (MORBID OBESITY) (HCC): ICD-10-CM

## 2023-04-20 DIAGNOSIS — F41.0 GENERALIZED ANXIETY DISORDER WITH PANIC ATTACKS: ICD-10-CM

## 2023-04-20 DIAGNOSIS — F50.81 BINGE EATING DISORDER: ICD-10-CM

## 2023-04-20 DIAGNOSIS — F60.3 BORDERLINE PERSONALITY DISORDER (HCC): ICD-10-CM

## 2023-04-20 PROCEDURE — 99214 OFFICE O/P EST MOD 30 MIN: CPT | Performed by: PSYCHIATRY & NEUROLOGY

## 2023-04-20 RX ORDER — ESCITALOPRAM OXALATE 20 MG/1
20 TABLET ORAL DAILY
Qty: 30 TABLET | Refills: 3 | Status: SHIPPED | OUTPATIENT
Start: 2023-04-20

## 2023-04-20 RX ORDER — HYDROXYZINE HYDROCHLORIDE 25 MG/1
25 TABLET, FILM COATED ORAL EVERY 8 HOURS PRN
Qty: 90 TABLET | Refills: 3 | Status: SHIPPED | OUTPATIENT
Start: 2023-04-20 | End: 2023-08-18

## 2023-04-20 RX ORDER — TOPIRAMATE 100 MG/1
100 TABLET, FILM COATED ORAL 2 TIMES DAILY
Qty: 60 TABLET | Refills: 3 | Status: SHIPPED | OUTPATIENT
Start: 2023-04-20

## 2023-04-20 RX ORDER — DEXTROAMPHETAMINE SACCHARATE, AMPHETAMINE ASPARTATE, DEXTROAMPHETAMINE SULFATE AND AMPHETAMINE SULFATE 2.5; 2.5; 2.5; 2.5 MG/1; MG/1; MG/1; MG/1
10 TABLET ORAL DAILY
Qty: 30 TABLET | Refills: 0 | Status: SHIPPED | OUTPATIENT
Start: 2023-05-29 | End: 2023-04-20 | Stop reason: SDUPTHER

## 2023-04-20 RX ORDER — ARIPIPRAZOLE 10 MG/1
10 TABLET ORAL DAILY
Qty: 30 TABLET | Refills: 3 | Status: SHIPPED | OUTPATIENT
Start: 2023-04-20

## 2023-04-20 RX ORDER — GABAPENTIN 300 MG/1
300-600 CAPSULE ORAL NIGHTLY
Qty: 60 CAPSULE | Refills: 3 | Status: SHIPPED | OUTPATIENT
Start: 2023-04-20 | End: 2023-08-18

## 2023-04-20 RX ORDER — DEXTROAMPHETAMINE SACCHARATE, AMPHETAMINE ASPARTATE, DEXTROAMPHETAMINE SULFATE AND AMPHETAMINE SULFATE 2.5; 2.5; 2.5; 2.5 MG/1; MG/1; MG/1; MG/1
10 TABLET ORAL DAILY
Qty: 30 TABLET | Refills: 0 | Status: SHIPPED | OUTPATIENT
Start: 2023-06-28 | End: 2023-07-28

## 2023-04-20 ASSESSMENT — ANXIETY QUESTIONNAIRES
7. FEELING AFRAID AS IF SOMETHING AWFUL MIGHT HAPPEN: 2
2. NOT BEING ABLE TO STOP OR CONTROL WORRYING: 3
4. TROUBLE RELAXING: 3
1. FEELING NERVOUS, ANXIOUS, OR ON EDGE: 3
6. BECOMING EASILY ANNOYED OR IRRITABLE: 3
3. WORRYING TOO MUCH ABOUT DIFFERENT THINGS: 3
GAD7 TOTAL SCORE: 19
5. BEING SO RESTLESS THAT IT IS HARD TO SIT STILL: 2
IF YOU CHECKED OFF ANY PROBLEMS ON THIS QUESTIONNAIRE, HOW DIFFICULT HAVE THESE PROBLEMS MADE IT FOR YOU TO DO YOUR WORK, TAKE CARE OF THINGS AT HOME, OR GET ALONG WITH OTHER PEOPLE: SOMEWHAT DIFFICULT

## 2023-04-20 ASSESSMENT — PATIENT HEALTH QUESTIONNAIRE - PHQ9
10. IF YOU CHECKED OFF ANY PROBLEMS, HOW DIFFICULT HAVE THESE PROBLEMS MADE IT FOR YOU TO DO YOUR WORK, TAKE CARE OF THINGS AT HOME, OR GET ALONG WITH OTHER PEOPLE: 2
8. MOVING OR SPEAKING SO SLOWLY THAT OTHER PEOPLE COULD HAVE NOTICED. OR THE OPPOSITE, BEING SO FIGETY OR RESTLESS THAT YOU HAVE BEEN MOVING AROUND A LOT MORE THAN USUAL: 3
4. FEELING TIRED OR HAVING LITTLE ENERGY: 0
9. THOUGHTS THAT YOU WOULD BE BETTER OFF DEAD, OR OF HURTING YOURSELF: 0
3. TROUBLE FALLING OR STAYING ASLEEP: 2
5. POOR APPETITE OR OVEREATING: 3
SUM OF ALL RESPONSES TO PHQ QUESTIONS 1-9: 17
SUM OF ALL RESPONSES TO PHQ QUESTIONS 1-9: 17
2. FEELING DOWN, DEPRESSED OR HOPELESS: 1
6. FEELING BAD ABOUT YOURSELF - OR THAT YOU ARE A FAILURE OR HAVE LET YOURSELF OR YOUR FAMILY DOWN: 3
7. TROUBLE CONCENTRATING ON THINGS, SUCH AS READING THE NEWSPAPER OR WATCHING TELEVISION: 3
1. LITTLE INTEREST OR PLEASURE IN DOING THINGS: 2
SUM OF ALL RESPONSES TO PHQ QUESTIONS 1-9: 17
SUM OF ALL RESPONSES TO PHQ9 QUESTIONS 1 & 2: 3
SUM OF ALL RESPONSES TO PHQ QUESTIONS 1-9: 17

## 2023-04-20 NOTE — PROGRESS NOTES
Patient:  Sita Peck  Age:  23 y. o.  :  2000     SEX:  female MRN:  [id number]     RACE: White (non-)     SEEN:  [x]  PATIENT  []  SPOUSE []  OTHER:              PHQ-9  2022   Little interest or pleasure in doing things 2 0 0   Little interest or pleasure in doing things - - -   Feeling down, depressed, or hopeless 1 0 1   Trouble falling or staying asleep, or sleeping too much 2 3 3   Trouble falling or staying asleep, or sleeping too much - - -   Feeling tired or having little energy 0 3 3   Feeling tired or having little energy - - -   Poor appetite or overeating 3 3 2   Poor appetite, weight loss, or overeating - - -   Feeling bad about yourself - or that you are a failure or have let yourself or your family down 3 3 0   Feeling bad about yourself - or that you are a failure or have let yourself or your family down - - -   Trouble concentrating on things, such as reading the newspaper or watching television 3 3 2   Trouble concentrating on things such as school, work, reading, or watching TV - - -   Moving or speaking so slowly that other people could have noticed. Or the opposite - being so fidgety or restless that you have been moving around a lot more than usual 3 0 0   Moving or speaking so slowly that other people could have noticed; or the opposite being so fidgety that others notice - - -   Thoughts that you would be better off dead, or of hurting yourself in some way 0 0 0   Thoughts of being better off dead, or hurting yourself in some way - - -   PHQ-2 Score 3 0 1   Total Score PHQ 2 - - -   PHQ-9 Total Score 17 15 11   PHQ 9 Score - - -   If you checked off any problems, how difficult have these problems made it for you to do your work, take care of things at home, or get along with other people?  2 2 1   How difficult have these problems made it for you to do your work, take care of your home and get along with others - - -       REG[place]-7 SCREENING

## 2023-07-06 ENCOUNTER — APPOINTMENT (OUTPATIENT)
Dept: ULTRASOUND IMAGING | Age: 23
End: 2023-07-06
Payer: MEDICAID

## 2023-07-06 ENCOUNTER — HOSPITAL ENCOUNTER (EMERGENCY)
Age: 23
Discharge: HOME | End: 2023-07-06
Attending: EMERGENCY MEDICINE
Payer: MEDICAID

## 2023-07-06 VITALS
HEIGHT: 64 IN | DIASTOLIC BLOOD PRESSURE: 75 MMHG | HEART RATE: 107 BPM | TEMPERATURE: 98.6 F | RESPIRATION RATE: 16 BRPM | WEIGHT: 240 LBS | SYSTOLIC BLOOD PRESSURE: 112 MMHG | BODY MASS INDEX: 40.97 KG/M2 | OXYGEN SATURATION: 98 %

## 2023-07-06 DIAGNOSIS — R10.9 ABDOMINAL PAIN AFFECTING PREGNANCY: Primary | ICD-10-CM

## 2023-07-06 DIAGNOSIS — O26.899 ABDOMINAL PAIN AFFECTING PREGNANCY: Primary | ICD-10-CM

## 2023-07-06 LAB
ALBUMIN SERPL-MCNC: 3.5 G/DL (ref 3.5–5)
ALBUMIN/GLOB SERPL: 0.9 (ref 0.4–1.6)
ALP SERPL-CCNC: 82 U/L (ref 50–136)
ALT SERPL-CCNC: 21 U/L (ref 12–65)
ANION GAP SERPL CALC-SCNC: 7 MMOL/L (ref 2–11)
APPEARANCE UR: CLEAR
AST SERPL-CCNC: 14 U/L (ref 15–37)
BASOPHILS # BLD: 0 K/UL (ref 0–0.2)
BASOPHILS NFR BLD: 1 % (ref 0–2)
BILIRUB SERPL-MCNC: 0.3 MG/DL (ref 0.2–1.1)
BILIRUB UR QL: NEGATIVE
BUN SERPL-MCNC: 10 MG/DL (ref 6–23)
CALCIUM SERPL-MCNC: 9.2 MG/DL (ref 8.3–10.4)
CHLORIDE SERPL-SCNC: 107 MMOL/L (ref 101–110)
CO2 SERPL-SCNC: 27 MMOL/L (ref 21–32)
COLOR UR: ABNORMAL
CREAT SERPL-MCNC: 0.68 MG/DL (ref 0.6–1)
DIFFERENTIAL METHOD BLD: ABNORMAL
EOSINOPHIL # BLD: 0.2 K/UL (ref 0–0.8)
EOSINOPHIL NFR BLD: 2 % (ref 0.5–7.8)
ERYTHROCYTE [DISTWIDTH] IN BLOOD BY AUTOMATED COUNT: 13.1 % (ref 11.9–14.6)
GLOBULIN SER CALC-MCNC: 3.9 G/DL (ref 2.8–4.5)
GLUCOSE SERPL-MCNC: 103 MG/DL (ref 65–100)
GLUCOSE UR STRIP.AUTO-MCNC: NEGATIVE MG/DL
HCG SERPL-ACNC: 149 MIU/ML (ref 0–6)
HCT VFR BLD AUTO: 39.9 % (ref 35.8–46.3)
HGB BLD-MCNC: 12.9 G/DL (ref 11.7–15.4)
HGB UR QL STRIP: NEGATIVE
IMM GRANULOCYTES # BLD AUTO: 0 K/UL (ref 0–0.5)
IMM GRANULOCYTES NFR BLD AUTO: 1 % (ref 0–5)
KETONES UR QL STRIP.AUTO: NEGATIVE MG/DL
LEUKOCYTE ESTERASE UR QL STRIP.AUTO: NEGATIVE
LIPASE SERPL-CCNC: 107 U/L (ref 73–393)
LYMPHOCYTES # BLD: 2 K/UL (ref 0.5–4.6)
LYMPHOCYTES NFR BLD: 24 % (ref 13–44)
MCH RBC QN AUTO: 29.1 PG (ref 26.1–32.9)
MCHC RBC AUTO-ENTMCNC: 32.3 G/DL (ref 31.4–35)
MCV RBC AUTO: 89.9 FL (ref 82–102)
MONOCYTES # BLD: 0.5 K/UL (ref 0.1–1.3)
MONOCYTES NFR BLD: 6 % (ref 4–12)
NEUTS SEG # BLD: 5.7 K/UL (ref 1.7–8.2)
NEUTS SEG NFR BLD: 67 % (ref 43–78)
NITRITE UR QL STRIP.AUTO: NEGATIVE
NRBC # BLD: 0 K/UL (ref 0–0.2)
PH UR STRIP: 5 (ref 5–9)
PLATELET # BLD AUTO: 302 K/UL (ref 150–450)
PMV BLD AUTO: 8.7 FL (ref 9.4–12.3)
POTASSIUM SERPL-SCNC: 3.7 MMOL/L (ref 3.5–5.1)
PROT SERPL-MCNC: 7.4 G/DL (ref 6.3–8.2)
PROT UR STRIP-MCNC: NEGATIVE MG/DL
RBC # BLD AUTO: 4.44 M/UL (ref 4.05–5.2)
SERVICE CMNT-IMP: NORMAL
SODIUM SERPL-SCNC: 141 MMOL/L (ref 133–143)
SP GR UR REFRACTOMETRY: 1.02 (ref 1–1.02)
UROBILINOGEN UR QL STRIP.AUTO: 0.2 EU/DL (ref 0.2–1)
WBC # BLD AUTO: 8.5 K/UL (ref 4.3–11.1)
WET PREP GENITAL: NORMAL
WET PREP GENITAL: NORMAL

## 2023-07-06 PROCEDURE — 83690 ASSAY OF LIPASE: CPT

## 2023-07-06 PROCEDURE — 87210 SMEAR WET MOUNT SALINE/INK: CPT

## 2023-07-06 PROCEDURE — 99284 EMERGENCY DEPT VISIT MOD MDM: CPT

## 2023-07-06 PROCEDURE — 87591 N.GONORRHOEAE DNA AMP PROB: CPT

## 2023-07-06 PROCEDURE — 80053 COMPREHEN METABOLIC PANEL: CPT

## 2023-07-06 PROCEDURE — 85025 COMPLETE CBC W/AUTO DIFF WBC: CPT

## 2023-07-06 PROCEDURE — 84702 CHORIONIC GONADOTROPIN TEST: CPT

## 2023-07-06 PROCEDURE — 87491 CHLMYD TRACH DNA AMP PROBE: CPT

## 2023-07-06 PROCEDURE — 76801 OB US < 14 WKS SINGLE FETUS: CPT

## 2023-07-06 PROCEDURE — 81003 URINALYSIS AUTO W/O SCOPE: CPT

## 2023-07-06 RX ORDER — 0.9 % SODIUM CHLORIDE 0.9 %
1000 INTRAVENOUS SOLUTION INTRAVENOUS
Status: DISCONTINUED | OUTPATIENT
Start: 2023-07-06 | End: 2023-07-07 | Stop reason: HOSPADM

## 2023-07-06 ASSESSMENT — PAIN - FUNCTIONAL ASSESSMENT: PAIN_FUNCTIONAL_ASSESSMENT: 0-10

## 2023-07-06 ASSESSMENT — ENCOUNTER SYMPTOMS
VOMITING: 0
SORE THROAT: 0
EYES NEGATIVE: 1
COUGH: 0
NAUSEA: 0
SHORTNESS OF BREATH: 0
ALLERGIC/IMMUNOLOGIC NEGATIVE: 1
ABDOMINAL PAIN: 1
HEMATEMESIS: 0
HEMATOCHEZIA: 0
RESPIRATORY NEGATIVE: 1
CONSTIPATION: 0
BELCHING: 0
FLATUS: 0
DIARRHEA: 0

## 2023-07-06 ASSESSMENT — PAIN DESCRIPTION - LOCATION: LOCATION: ABDOMEN

## 2023-07-06 ASSESSMENT — PAIN SCALES - GENERAL: PAINLEVEL_OUTOF10: 5

## 2023-07-06 ASSESSMENT — PAIN DESCRIPTION - ORIENTATION: ORIENTATION: RIGHT;LOWER

## 2023-07-06 NOTE — ED PROVIDER NOTES
Emergency Department Provider Note       PCP: HAMLET Guerrier CNP   Age: 23 y.o. Sex: female     DISPOSITION Decision To Discharge 07/06/2023 10:19:58 PM       ICD-10-CM    1. Abdominal pain affecting pregnancy  O26.899 External Referral to Ob/Gyn    R10.9           Medical Decision Making     Complexity of Problems Addressed:  1 or more acute illnesses that pose a threat to life or bodily function. Data Reviewed and Analyzed:  Category 1:   I independently ordered and reviewed each unique test.  I reviewed external records: ED visit note from an outside group. The patients assessment required an independent historian: none. The reason they were needed is . Category 2:       Category 3: Discussion of management or test interpretation. Patient's ultrasound does not show an intrauterine pregnancy however they do not see anything in the adnexa or a large amount of free fluid in the pelvis. Patient was referred to [facility] for close follow-up within 48 to 72 hours. She will need repeat quantitative hCG and/or pelvic ultrasound. She was given strict return precautions to return to the ED if worsening in any way. She is blood type O- and is not bleeding today. There is no blood on vaginal exam.  She was told if she does start bleeding to return to the ED as well for further evaluation. She was written for prenatal multivitamins with folic acid, told to take plenty of fluids, pelvic rest and return to the ED if worsening. Call to make an appointment with OB. She is stable for discharge and ambulatory out of the ED without difficulty at this time. Repeat pulse is 84 beats a minute and normal.  Patient was anxious coming to the ED and I feel the tachycardia was coming from that. Risk of Complications and/or Morbidity of Patient Management:  Prescription drug management performed. Shared medical decision making was utilized in creating the patients health plan today. Considerations:  The

## 2023-07-06 NOTE — ED TRIAGE NOTES
Patient advises that she had a at home positive pregnancy test last night and today she started with right lower quad pain. Patient advises pain comes and goes. Patient advises it was a throbbing pain. No vaginal bleeding. Patient advises some nausea. Patient advises that this would be a second pregnancy and 1 live birth at home. LMP was \"sometime roughly in May\".

## 2023-07-07 NOTE — DISCHARGE INSTRUCTIONS
Drink plenty of fluids, rest, take the prenatal multivitamins as directed. You must follow-up in 48 to 72 hours with OB for repeat quantitative hCG and or ultrasound. If you cannot get in with them, return to the ED for repeat levels. If you are worsening in any way before then, return to the ED immediately. If you start bleeding, return to the ED immediately.

## 2023-07-26 ENCOUNTER — TELEPHONE (OUTPATIENT)
Dept: OBGYN CLINIC | Age: 23
End: 2023-07-26

## 2023-07-26 NOTE — TELEPHONE ENCOUNTER
Pt was a no show for OB education on 7/24/23. No show letter mailed to address on file. Attempted to contact pt to follow up, no answer. LM instructing pt to return call.

## 2023-08-07 ENCOUNTER — TELEPHONE (OUTPATIENT)
Dept: BEHAVIORAL/MENTAL HEALTH CLINIC | Age: 23
End: 2023-08-07

## 2023-08-07 NOTE — TELEPHONE ENCOUNTER
PT next aot 11/06/2023, pt requesting medication refill on all her meds,please call pt back thank you.

## 2023-08-10 DIAGNOSIS — F50.81 BINGE EATING DISORDER: ICD-10-CM

## 2023-08-10 RX ORDER — HYDROXYZINE HYDROCHLORIDE 25 MG/1
25 TABLET, FILM COATED ORAL EVERY 8 HOURS PRN
Qty: 90 TABLET | Refills: 3 | Status: CANCELLED | OUTPATIENT
Start: 2023-08-10 | End: 2023-12-08

## 2023-08-10 RX ORDER — TOPIRAMATE 100 MG/1
100 TABLET, FILM COATED ORAL 2 TIMES DAILY
Qty: 60 TABLET | Refills: 3 | Status: CANCELLED | OUTPATIENT
Start: 2023-08-10

## 2023-08-10 RX ORDER — ESCITALOPRAM OXALATE 20 MG/1
20 TABLET ORAL DAILY
Qty: 30 TABLET | Refills: 3 | Status: CANCELLED | OUTPATIENT
Start: 2023-08-10

## 2023-08-10 RX ORDER — ARIPIPRAZOLE 10 MG/1
10 TABLET ORAL DAILY
Qty: 30 TABLET | Refills: 3 | Status: CANCELLED | OUTPATIENT
Start: 2023-08-10

## 2023-08-10 RX ORDER — GABAPENTIN 300 MG/1
300-600 CAPSULE ORAL NIGHTLY
Qty: 60 CAPSULE | Refills: 3 | Status: CANCELLED | OUTPATIENT
Start: 2023-08-10 | End: 2023-12-08

## 2023-08-10 RX ORDER — DEXTROAMPHETAMINE SACCHARATE, AMPHETAMINE ASPARTATE, DEXTROAMPHETAMINE SULFATE AND AMPHETAMINE SULFATE 2.5; 2.5; 2.5; 2.5 MG/1; MG/1; MG/1; MG/1
10 TABLET ORAL DAILY
Qty: 30 TABLET | Refills: 0 | Status: CANCELLED | OUTPATIENT
Start: 2023-08-10 | End: 2023-09-09

## 2023-08-11 NOTE — TELEPHONE ENCOUNTER
Have attempted to contact via phone multiple times. Call will not go through. Patient is active on [employer], sent a [employer] message to ask her to contact the office so that I can ask her.

## 2023-08-14 NOTE — TELEPHONE ENCOUNTER
Call will not go through. I still need to speak with patient before Dr Alexandru Bowen can send any refills.

## 2023-08-15 NOTE — TELEPHONE ENCOUNTER
Patient sent [employer] message that her phone is off and provided alternate number of [phone]. Called this number and left a message.

## 2023-08-30 NOTE — TELEPHONE ENCOUNTER
Left another message for patient on alternate number, as call will still not go through on her number, to please call us back.

## 2023-09-14 NOTE — TELEPHONE ENCOUNTER
Patient stated that she had a medical  in July and is not pregnant. LMP 23. Would like to get refills for medications to last until  appt.     [facility] except for Adderall- goes to [facility]

## 2023-09-20 DIAGNOSIS — F50.81 BINGE EATING DISORDER: ICD-10-CM

## 2023-10-19 ENCOUNTER — OFFICE VISIT (OUTPATIENT)
Dept: PRIMARY CARE CLINIC | Facility: CLINIC | Age: 23
End: 2023-10-19
Payer: MEDICAID

## 2023-10-19 ENCOUNTER — OFFICE VISIT (OUTPATIENT)
Dept: BEHAVIORAL/MENTAL HEALTH CLINIC | Age: 23
End: 2023-10-19
Payer: MEDICAID

## 2023-10-19 VITALS
BODY MASS INDEX: 50.02 KG/M2 | HEART RATE: 97 BPM | SYSTOLIC BLOOD PRESSURE: 132 MMHG | OXYGEN SATURATION: 95 % | DIASTOLIC BLOOD PRESSURE: 82 MMHG | TEMPERATURE: 97.5 F | HEIGHT: 64 IN | WEIGHT: 293 LBS

## 2023-10-19 VITALS
BODY MASS INDEX: 50.02 KG/M2 | OXYGEN SATURATION: 100 % | DIASTOLIC BLOOD PRESSURE: 80 MMHG | SYSTOLIC BLOOD PRESSURE: 127 MMHG | WEIGHT: 293 LBS | TEMPERATURE: 98.4 F | HEIGHT: 64 IN | HEART RATE: 88 BPM

## 2023-10-19 DIAGNOSIS — F31.32 BIPOLAR AFFECTIVE DISORDER, CURRENTLY DEPRESSED, MODERATE (HCC): Primary | ICD-10-CM

## 2023-10-19 DIAGNOSIS — F41.0 GENERALIZED ANXIETY DISORDER WITH PANIC ATTACKS: ICD-10-CM

## 2023-10-19 DIAGNOSIS — E66.01 MORBID OBESITY (HCC): ICD-10-CM

## 2023-10-19 DIAGNOSIS — Z30.09 ENCOUNTER FOR OTHER GENERAL COUNSELING OR ADVICE ON CONTRACEPTION: ICD-10-CM

## 2023-10-19 DIAGNOSIS — F98.8 ATTENTION DEFICIT DISORDER, UNSPECIFIED HYPERACTIVITY PRESENCE: ICD-10-CM

## 2023-10-19 DIAGNOSIS — R45.851 PASSIVE SUICIDAL IDEATIONS: ICD-10-CM

## 2023-10-19 DIAGNOSIS — F41.1 GENERALIZED ANXIETY DISORDER: ICD-10-CM

## 2023-10-19 DIAGNOSIS — Z30.011 OCP (ORAL CONTRACEPTIVE PILLS) INITIATION: Primary | ICD-10-CM

## 2023-10-19 DIAGNOSIS — E66.01 OBESITY, CLASS III, BMI 40-49.9 (MORBID OBESITY) (HCC): ICD-10-CM

## 2023-10-19 DIAGNOSIS — F51.01 PRIMARY INSOMNIA: ICD-10-CM

## 2023-10-19 DIAGNOSIS — Z33.2 MEDICAL ABORTION: ICD-10-CM

## 2023-10-19 DIAGNOSIS — F31.32 BIPOLAR AFFECTIVE DISORDER, CURRENTLY DEPRESSED, MODERATE (HCC): ICD-10-CM

## 2023-10-19 DIAGNOSIS — F33.1 MODERATE EPISODE OF RECURRENT MAJOR DEPRESSIVE DISORDER (HCC): ICD-10-CM

## 2023-10-19 DIAGNOSIS — Z76.89 ENCOUNTER TO ESTABLISH CARE WITH NEW DOCTOR: ICD-10-CM

## 2023-10-19 DIAGNOSIS — F50.81 BINGE EATING DISORDER: ICD-10-CM

## 2023-10-19 DIAGNOSIS — F41.1 GENERALIZED ANXIETY DISORDER WITH PANIC ATTACKS: ICD-10-CM

## 2023-10-19 DIAGNOSIS — F60.3 BORDERLINE PERSONALITY DISORDER (HCC): ICD-10-CM

## 2023-10-19 LAB
HCG, PREGNANCY, URINE, POC: NEGATIVE
VALID INTERNAL CONTROL, POC: YES

## 2023-10-19 PROCEDURE — 99214 OFFICE O/P EST MOD 30 MIN: CPT | Performed by: PSYCHIATRY & NEUROLOGY

## 2023-10-19 PROCEDURE — 99203 OFFICE O/P NEW LOW 30 MIN: CPT | Performed by: FAMILY MEDICINE

## 2023-10-19 PROCEDURE — 81025 URINE PREGNANCY TEST: CPT | Performed by: FAMILY MEDICINE

## 2023-10-19 RX ORDER — GABAPENTIN 300 MG/1
300-600 CAPSULE ORAL NIGHTLY
Qty: 60 CAPSULE | Refills: 3 | Status: SHIPPED | OUTPATIENT
Start: 2023-10-19 | End: 2024-02-16

## 2023-10-19 RX ORDER — TOPIRAMATE 100 MG/1
150 TABLET, FILM COATED ORAL 2 TIMES DAILY
Qty: 90 TABLET | Refills: 3 | Status: SHIPPED | OUTPATIENT
Start: 2023-10-19

## 2023-10-19 RX ORDER — DEXTROAMPHETAMINE SACCHARATE, AMPHETAMINE ASPARTATE, DEXTROAMPHETAMINE SULFATE AND AMPHETAMINE SULFATE 2.5; 2.5; 2.5; 2.5 MG/1; MG/1; MG/1; MG/1
10 TABLET ORAL DAILY
Qty: 30 TABLET | Refills: 0 | Status: SHIPPED | OUTPATIENT
Start: 2023-10-19 | End: 2023-11-18

## 2023-10-19 RX ORDER — BUPROPION HYDROCHLORIDE 150 MG/1
150 TABLET ORAL EVERY MORNING
Qty: 30 TABLET | Refills: 3 | Status: SHIPPED | OUTPATIENT
Start: 2023-10-19

## 2023-10-19 RX ORDER — ESCITALOPRAM OXALATE 10 MG/1
10 TABLET ORAL DAILY
Qty: 30 TABLET | Refills: 3 | Status: SHIPPED | OUTPATIENT
Start: 2023-10-19

## 2023-10-19 RX ORDER — NORGESTIMATE AND ETHINYL ESTRADIOL 7DAYSX3 LO
1 KIT ORAL DAILY
Qty: 3 PACKET | Refills: 0 | Status: SHIPPED | OUTPATIENT
Start: 2023-10-19

## 2023-10-19 ASSESSMENT — ANXIETY QUESTIONNAIRES
6. BECOMING EASILY ANNOYED OR IRRITABLE: 1
5. BEING SO RESTLESS THAT IT IS HARD TO SIT STILL: 2
7. FEELING AFRAID AS IF SOMETHING AWFUL MIGHT HAPPEN: 2
GAD7 TOTAL SCORE: 17
IF YOU CHECKED OFF ANY PROBLEMS ON THIS QUESTIONNAIRE, HOW DIFFICULT HAVE THESE PROBLEMS MADE IT FOR YOU TO DO YOUR WORK, TAKE CARE OF THINGS AT HOME, OR GET ALONG WITH OTHER PEOPLE: VERY DIFFICULT
1. FEELING NERVOUS, ANXIOUS, OR ON EDGE: 3
3. WORRYING TOO MUCH ABOUT DIFFERENT THINGS: 3
4. TROUBLE RELAXING: 3
2. NOT BEING ABLE TO STOP OR CONTROL WORRYING: 3

## 2023-10-19 ASSESSMENT — PATIENT HEALTH QUESTIONNAIRE - PHQ9
3. TROUBLE FALLING OR STAYING ASLEEP: 3
SUM OF ALL RESPONSES TO PHQ9 QUESTIONS 1 & 2: 4
10. IF YOU CHECKED OFF ANY PROBLEMS, HOW DIFFICULT HAVE THESE PROBLEMS MADE IT FOR YOU TO DO YOUR WORK, TAKE CARE OF THINGS AT HOME, OR GET ALONG WITH OTHER PEOPLE: 2
SUM OF ALL RESPONSES TO PHQ QUESTIONS 1-9: 17
SUM OF ALL RESPONSES TO PHQ QUESTIONS 1-9: 18
8. MOVING OR SPEAKING SO SLOWLY THAT OTHER PEOPLE COULD HAVE NOTICED. OR THE OPPOSITE, BEING SO FIGETY OR RESTLESS THAT YOU HAVE BEEN MOVING AROUND A LOT MORE THAN USUAL: 0
4. FEELING TIRED OR HAVING LITTLE ENERGY: 3
1. LITTLE INTEREST OR PLEASURE IN DOING THINGS: 2
SUM OF ALL RESPONSES TO PHQ QUESTIONS 1-9: 18
SUM OF ALL RESPONSES TO PHQ QUESTIONS 1-9: 18
9. THOUGHTS THAT YOU WOULD BE BETTER OFF DEAD, OR OF HURTING YOURSELF: 1
7. TROUBLE CONCENTRATING ON THINGS, SUCH AS READING THE NEWSPAPER OR WATCHING TELEVISION: 1
6. FEELING BAD ABOUT YOURSELF - OR THAT YOU ARE A FAILURE OR HAVE LET YOURSELF OR YOUR FAMILY DOWN: 3
2. FEELING DOWN, DEPRESSED OR HOPELESS: 2
5. POOR APPETITE OR OVEREATING: 3

## 2023-10-19 ASSESSMENT — COLUMBIA-SUICIDE SEVERITY RATING SCALE - C-SSRS
BASED ON RESPONSES TO C-SSRS QS 1-6, WHAT IS THE PATIENT'S OVERALL RISK RATING FOR SUICIDE: MODERATE RISK
2. HAVE YOU ACTUALLY HAD ANY THOUGHTS OF KILLING YOURSELF?: NO
4. HAVE YOU HAD THESE THOUGHTS AND HAD SOME INTENTION OF ACTING ON THEM?: NO
3. HAVE YOU BEEN THINKING ABOUT HOW YOU MIGHT KILL YOURSELF?: NO
7. DID THIS OCCUR IN THE LAST THREE MONTHS: NO
6. HAVE YOU EVER DONE ANYTHING, STARTED TO DO ANYTHING, OR PREPARED TO DO ANYTHING TO END YOUR LIFE?: YES
1. WITHIN THE PAST MONTH, HAVE YOU WISHED YOU WERE DEAD OR WISHED YOU COULD GO TO SLEEP AND NOT WAKE UP?: NO
5. HAVE YOU STARTED TO WORK OUT OR WORKED OUT THE DETAILS OF HOW TO KILL YOURSELF? DO YOU INTEND TO CARRY OUT THIS PLAN?: NO

## 2023-10-19 NOTE — PROGRESS NOTES
Patient:  Silke Le  Age:  20 y.o.  :  2000     SEX:  female MRN:  [id number]     RACE: White (non-)     SEEN:  [x]  PATIENT  []  SPOUSE []  OTHER:                  10/19/2023    11:23 AM 2023    12:59 PM 2023    10:45 AM   PHQ-9    Little interest or pleasure in doing things 2 2 0   Feeling down, depressed, or hopeless 2 1 0   Trouble falling or staying asleep, or sleeping too much 3 2 3   Feeling tired or having little energy 3 0 3   Poor appetite or overeating 3 3 3   Feeling bad about yourself - or that you are a failure or have let yourself or your family down 3 3 3   Trouble concentrating on things, such as reading the newspaper or watching television 1 3 3   Moving or speaking so slowly that other people could have noticed. Or the opposite - being so fidgety or restless that you have been moving around a lot more than usual 0 3 0   Thoughts that you would be better off dead, or of hurting yourself in some way 1 0 0   PHQ-2 Score 4 3 0   PHQ-9 Total Score 18 17 15   If you checked off any problems, how difficult have these problems made it for you to do your work, take care of things at home, or get along with other people?  2 2 2           10/19/2023    11:26 AM 2023     1:00 PM 2023    10:46 AM   REGINA-7 SCREENING   Feeling nervous, anxious, or on edge Nearly every day Nearly every day Nearly every day   Not being able to stop or control worrying Nearly every day Nearly every day Nearly every day   Worrying too much about different things Nearly every day Nearly every day Nearly every day   Trouble relaxing Nearly every day Nearly every day Nearly every day   Being so restless that it is hard to sit still More than half the days More than half the days Nearly every day   Becoming easily annoyed or irritable Several days Nearly every day More than half the days   Feeling afraid as if something awful might happen More than half the days More than half the days

## 2023-10-19 NOTE — PROGRESS NOTES
COMPARISON  -     Norgestim-Eth Estrad Triphasic (TRI-LO-SPRINTEC) 0.18/0.215/0.25 MG-25 MCG TABS; Take 1 tablet by mouth daily, Disp-3 packet, R-0Please educate patient on how to take itNormal  -     [facility]  2. Encounter for other general counseling or advice on contraception  -     AMB POC URINE PREGNANCY TEST, VISUAL COLOR COMPARISON  -     Norgestim-Eth Estrad Triphasic (TRI-LO-SPRINTEC) 0.18/0.215/0.25 MG-25 MCG TABS; Take 1 tablet by mouth daily, Disp-3 packet, R-0Please educate patient on how to take itNormal  -     [facility]  3. Medical   Overview:  Recent history of it in July  Orders:  -     AMB POC URINE PREGNANCY TEST, VISUAL COLOR COMPARISON  -     [facility]  4. Bipolar affective disorder, currently depressed, moderate ([address])  Overview: Follows with psychiatry  Psychiatrist recommended for patient to be seen by PCP to be on birth control  5. Generalized anxiety disorder  Overview: Follows with psychiatry  6. Moderate episode of recurrent major depressive disorder ([address])  Overview: Follows with psychiatry  7. Morbid obesity ([address])  8. Encounter to establish care with new doctor    All questions and concerns answered. Patient voiced understanding and agrees with POC. Chronic condition/Plan:  No problem-specific Assessment & Plan notes found for this encounter. Follow up:  Return in about 4 weeks (around 2023) for Physical.      Elements of this note have been dictated using speech recognition software. As a result, errors of speech recognition may have occurred. On this date, 10/19/2023 I have spent 40 minutes reviewing previous notes, test results and face to face with the patient discussing the diagnosis and importance of compliance with the treatment plan as well as documenting on the day of the visit.  Greater than 50% of this visit was spent counseling the patient about

## 2023-11-01 PROBLEM — Z30.011 OCP (ORAL CONTRACEPTIVE PILLS) INITIATION: Status: ACTIVE | Noted: 2023-11-01

## 2023-11-01 PROBLEM — F33.1 MODERATE EPISODE OF RECURRENT MAJOR DEPRESSIVE DISORDER (HCC): Status: ACTIVE | Noted: 2023-11-01

## 2023-11-01 PROBLEM — F41.1 GENERALIZED ANXIETY DISORDER: Status: ACTIVE | Noted: 2023-11-01

## 2023-11-01 PROBLEM — Z33.2 MEDICAL ABORTION: Status: ACTIVE | Noted: 2023-11-01

## 2023-11-01 PROBLEM — F31.32 BIPOLAR AFFECTIVE DISORDER, CURRENTLY DEPRESSED, MODERATE (HCC): Status: ACTIVE | Noted: 2023-11-01

## 2023-11-01 ASSESSMENT — ENCOUNTER SYMPTOMS
CONSTIPATION: 0
BLOOD IN STOOL: 0
RHINORRHEA: 0
WHEEZING: 0
ABDOMINAL PAIN: 0
VOMITING: 0
COUGH: 0
CHEST TIGHTNESS: 0
EYE PAIN: 0
SINUS PAIN: 0
DIARRHEA: 0
NAUSEA: 0
EYE REDNESS: 0
SINUS PRESSURE: 0
SORE THROAT: 0
SHORTNESS OF BREATH: 0
BACK PAIN: 0

## 2024-01-25 ENCOUNTER — TELEPHONE (OUTPATIENT)
Dept: BEHAVIORAL/MENTAL HEALTH CLINIC | Age: 24
End: 2024-01-25

## 2024-04-30 ENCOUNTER — APPOINTMENT (OUTPATIENT)
Dept: GENERAL RADIOLOGY | Age: 24
End: 2024-04-30
Payer: MEDICAID

## 2024-04-30 ENCOUNTER — HOSPITAL ENCOUNTER (EMERGENCY)
Age: 24
Discharge: HOME | End: 2024-04-30
Payer: MEDICAID

## 2024-04-30 VITALS
HEIGHT: 64 IN | HEART RATE: 83 BPM | WEIGHT: 280 LBS | SYSTOLIC BLOOD PRESSURE: 105 MMHG | BODY MASS INDEX: 47.8 KG/M2 | RESPIRATION RATE: 16 BRPM | OXYGEN SATURATION: 96 % | DIASTOLIC BLOOD PRESSURE: 73 MMHG | TEMPERATURE: 98.5 F

## 2024-04-30 DIAGNOSIS — R51.9 ACUTE NONINTRACTABLE HEADACHE, UNSPECIFIED HEADACHE TYPE: ICD-10-CM

## 2024-04-30 DIAGNOSIS — R07.9 CHEST PAIN, UNSPECIFIED TYPE: Primary | ICD-10-CM

## 2024-04-30 LAB
ALBUMIN SERPL-MCNC: 3.9 G/DL (ref 3.5–5)
ALBUMIN/GLOB SERPL: 1 (ref 1–1.9)
ALP SERPL-CCNC: 119 U/L (ref 35–104)
ALT SERPL-CCNC: 24 U/L (ref 12–65)
ANION GAP SERPL CALC-SCNC: 13 MMOL/L (ref 9–18)
AST SERPL-CCNC: 26 U/L (ref 15–37)
BASOPHILS # BLD: 0 K/UL (ref 0–0.2)
BASOPHILS NFR BLD: 1 % (ref 0–2)
BILIRUB SERPL-MCNC: 0.5 MG/DL (ref 0–1.2)
BUN SERPL-MCNC: 8 MG/DL (ref 6–23)
CALCIUM SERPL-MCNC: 9.7 MG/DL (ref 8.8–10.2)
CHLORIDE SERPL-SCNC: 101 MMOL/L (ref 98–107)
CO2 SERPL-SCNC: 25 MMOL/L (ref 20–28)
CREAT SERPL-MCNC: 0.72 MG/DL (ref 0.6–1.1)
D DIMER PPP FEU-MCNC: 0.43 UG/ML(FEU)
DIFFERENTIAL METHOD BLD: ABNORMAL
EOSINOPHIL # BLD: 0.2 K/UL (ref 0–0.8)
EOSINOPHIL NFR BLD: 3 % (ref 0.5–7.8)
ERYTHROCYTE [DISTWIDTH] IN BLOOD BY AUTOMATED COUNT: 13.2 % (ref 11.9–14.6)
GLOBULIN SER CALC-MCNC: 4.1 G/DL (ref 2.3–3.5)
GLUCOSE SERPL-MCNC: 96 MG/DL (ref 70–99)
HCG SERPL QL: NEGATIVE
HCT VFR BLD AUTO: 40.8 % (ref 35.8–46.3)
HGB BLD-MCNC: 13.6 G/DL (ref 11.7–15.4)
IMM GRANULOCYTES # BLD AUTO: 0 K/UL (ref 0–0.5)
IMM GRANULOCYTES NFR BLD AUTO: 1 % (ref 0–5)
LYMPHOCYTES # BLD: 2.3 K/UL (ref 0.5–4.6)
LYMPHOCYTES NFR BLD: 31 % (ref 13–44)
MCH RBC QN AUTO: 27.8 PG (ref 26.1–32.9)
MCHC RBC AUTO-ENTMCNC: 33.3 G/DL (ref 31.4–35)
MCV RBC AUTO: 83.4 FL (ref 82–102)
MONOCYTES # BLD: 0.5 K/UL (ref 0.1–1.3)
MONOCYTES NFR BLD: 7 % (ref 4–12)
NEUTS SEG # BLD: 4.4 K/UL (ref 1.7–8.2)
NEUTS SEG NFR BLD: 59 % (ref 43–78)
NRBC # BLD: 0 K/UL (ref 0–0.2)
PLATELET # BLD AUTO: 330 K/UL (ref 150–450)
PMV BLD AUTO: 8.7 FL (ref 9.4–12.3)
POTASSIUM SERPL-SCNC: 3.8 MMOL/L (ref 3.5–5.1)
PROT SERPL-MCNC: 8 G/DL (ref 6.3–8.2)
RBC # BLD AUTO: 4.89 M/UL (ref 4.05–5.2)
SODIUM SERPL-SCNC: 139 MMOL/L (ref 136–145)
TROPONIN T SERPL HS-MCNC: <6 NG/L (ref 0–14)
TROPONIN T SERPL HS-MCNC: <6 NG/L (ref 0–14)
WBC # BLD AUTO: 7.5 K/UL (ref 4.3–11.1)

## 2024-04-30 PROCEDURE — 84484 ASSAY OF TROPONIN QUANT: CPT

## 2024-04-30 PROCEDURE — 84703 CHORIONIC GONADOTROPIN ASSAY: CPT

## 2024-04-30 PROCEDURE — 99285 EMERGENCY DEPT VISIT HI MDM: CPT

## 2024-04-30 PROCEDURE — 93005 ELECTROCARDIOGRAM TRACING: CPT | Performed by: GENERAL PRACTICE

## 2024-04-30 PROCEDURE — 71046 X-RAY EXAM CHEST 2 VIEWS: CPT

## 2024-04-30 PROCEDURE — 96374 THER/PROPH/DIAG INJ IV PUSH: CPT

## 2024-04-30 PROCEDURE — 85025 COMPLETE CBC W/AUTO DIFF WBC: CPT

## 2024-04-30 PROCEDURE — 80053 COMPREHEN METABOLIC PANEL: CPT

## 2024-04-30 PROCEDURE — 6370000000 HC RX 637 (ALT 250 FOR IP): Performed by: NURSE PRACTITIONER

## 2024-04-30 PROCEDURE — 6360000002 HC RX W HCPCS: Performed by: NURSE PRACTITIONER

## 2024-04-30 PROCEDURE — 2580000003 HC RX 258: Performed by: NURSE PRACTITIONER

## 2024-04-30 PROCEDURE — 96361 HYDRATE IV INFUSION ADD-ON: CPT

## 2024-04-30 PROCEDURE — 85379 FIBRIN DEGRADATION QUANT: CPT

## 2024-04-30 RX ORDER — ACETAMINOPHEN 500 MG
1000 TABLET ORAL
Status: COMPLETED | OUTPATIENT
Start: 2024-04-30 | End: 2024-04-30

## 2024-04-30 RX ORDER — DIPHENHYDRAMINE HCL 25 MG
25 CAPSULE ORAL
Status: COMPLETED | OUTPATIENT
Start: 2024-04-30 | End: 2024-04-30

## 2024-04-30 RX ORDER — 0.9 % SODIUM CHLORIDE 0.9 %
1000 INTRAVENOUS SOLUTION INTRAVENOUS ONCE
Status: COMPLETED | OUTPATIENT
Start: 2024-04-30 | End: 2024-04-30

## 2024-04-30 RX ORDER — METOCLOPRAMIDE HYDROCHLORIDE 5 MG/ML
10 INJECTION INTRAMUSCULAR; INTRAVENOUS
Status: COMPLETED | OUTPATIENT
Start: 2024-04-30 | End: 2024-04-30

## 2024-04-30 RX ADMIN — ACETAMINOPHEN 1000 MG: 500 TABLET, FILM COATED ORAL at 16:46

## 2024-04-30 RX ADMIN — METOCLOPRAMIDE 10 MG: 5 INJECTION, SOLUTION INTRAMUSCULAR; INTRAVENOUS at 16:47

## 2024-04-30 RX ADMIN — SODIUM CHLORIDE 1000 ML: 9 INJECTION, SOLUTION INTRAVENOUS at 16:45

## 2024-04-30 RX ADMIN — DIPHENHYDRAMINE HYDROCHLORIDE 25 MG: 25 CAPSULE ORAL at 16:46

## 2024-04-30 ASSESSMENT — LIFESTYLE VARIABLES
HOW OFTEN DO YOU HAVE A DRINK CONTAINING ALCOHOL: NEVER
HOW MANY STANDARD DRINKS CONTAINING ALCOHOL DO YOU HAVE ON A TYPICAL DAY: PATIENT DOES NOT DRINK

## 2024-04-30 ASSESSMENT — PAIN SCALES - GENERAL
PAINLEVEL_OUTOF10: 2
PAINLEVEL_OUTOF10: 0

## 2024-04-30 ASSESSMENT — PAIN - FUNCTIONAL ASSESSMENT: PAIN_FUNCTIONAL_ASSESSMENT: 0-10

## 2024-04-30 NOTE — ED TRIAGE NOTES
Pt reports for about 3wks had productive cough which has resolved but now x1wk having \"heart pounding\" and pressure in chest intermittently without pain at this time. Intermittently dizzy with ringing in ears. Denies dyspnea or fevers    A&Ox4

## 2024-04-30 NOTE — ED PROVIDER NOTES
Narrative    XR CHEST (2 VW)    INDICATION:Chest Pain.    COMPARISON: None     FINDINGS: Cardiac size and mediastinal configuration are normal.  The lungs are  well expanded and clear.  No acute bony abnormalities are noted.       Impression    No acute pulmonary findings.     CBC with Auto Differential   Result Value Ref Range    WBC 7.5 4.3 - 11.1 K/uL    RBC 4.89 4.05 - 5.2 M/uL    Hemoglobin 13.6 11.7 - 15.4 g/dL    Hematocrit 40.8 35.8 - 46.3 %    MCV 83.4 82.0 - 102.0 FL    MCH 27.8 26.1 - 32.9 PG    MCHC 33.3 31.4 - 35.0 g/dL    RDW 13.2 11.9 - 14.6 %    Platelets 330 150 - 450 K/uL    MPV 8.7 (L) 9.4 - 12.3 FL    nRBC 0.00 0.0 - 0.2 K/uL    Differential Type AUTOMATED      Neutrophils % 59 43 - 78 %    Lymphocytes % 31 13 - 44 %    Monocytes % 7 4.0 - 12.0 %    Eosinophils % 3 0.5 - 7.8 %    Basophils % 1 0.0 - 2.0 %    Immature Granulocytes % 1 0.0 - 5.0 %    Neutrophils Absolute 4.4 1.7 - 8.2 K/UL    Lymphocytes Absolute 2.3 0.5 - 4.6 K/UL    Monocytes Absolute 0.5 0.1 - 1.3 K/UL    Eosinophils Absolute 0.2 0.0 - 0.8 K/UL    Basophils Absolute 0.0 0.0 - 0.2 K/UL    Immature Granulocytes Absolute 0.0 0.0 - 0.5 K/UL   Comprehensive Metabolic Panel w/ Reflex to MG   Result Value Ref Range    Sodium 139 136 - 145 mmol/L    Potassium 3.8 3.5 - 5.1 mmol/L    Chloride 101 98 - 107 mmol/L    CO2 25 20 - 28 mmol/L    Anion Gap 13 9 - 18 mmol/L    Glucose 96 70 - 99 mg/dL    BUN 8 6 - 23 MG/DL    Creatinine 0.72 0.60 - 1.10 MG/DL    Est, Glom Filt Rate >90 >60 ml/min/1.73m2    Calcium 9.7 8.8 - 10.2 MG/DL    Total Bilirubin 0.5 0.0 - 1.2 MG/DL    ALT 24 12 - 65 U/L    AST 26 15 - 37 U/L    Alk Phosphatase 119 (H) 35 - 104 U/L    Total Protein 8.0 6.3 - 8.2 g/dL    Albumin 3.9 3.5 - 5.0 g/dL    Globulin 4.1 (H) 2.3 - 3.5 g/dL    Albumin/Globulin Ratio 1.0 1.0 - 1.9     Troponin now then q90 min for 2 occurances   Result Value Ref Range    Troponin T <6.0 0 - 14 ng/L   Troponin   Result Value Ref Range    Troponin T

## 2024-05-01 LAB
EKG ATRIAL RATE: 106 BPM
EKG DIAGNOSIS: NORMAL
EKG P AXIS: 48 DEGREES
EKG P-R INTERVAL: 134 MS
EKG Q-T INTERVAL: 358 MS
EKG QRS DURATION: 82 MS
EKG QTC CALCULATION (BAZETT): 475 MS
EKG R AXIS: 28 DEGREES
EKG T AXIS: 33 DEGREES
EKG VENTRICULAR RATE: 106 BPM

## 2024-05-01 PROCEDURE — 93010 ELECTROCARDIOGRAM REPORT: CPT | Performed by: INTERNAL MEDICINE

## 2024-10-21 RX ORDER — ESCITALOPRAM OXALATE 10 MG/1
10 TABLET ORAL DAILY
Qty: 30 TABLET | Refills: 3 | OUTPATIENT
Start: 2024-10-21

## 2025-03-20 ENCOUNTER — HOSPITAL ENCOUNTER (EMERGENCY)
Age: 25
Discharge: HOME | End: 2025-03-20
Payer: MEDICAID

## 2025-03-20 VITALS
DIASTOLIC BLOOD PRESSURE: 86 MMHG | OXYGEN SATURATION: 99 % | HEART RATE: 89 BPM | WEIGHT: 280 LBS | SYSTOLIC BLOOD PRESSURE: 139 MMHG | BODY MASS INDEX: 48.06 KG/M2 | TEMPERATURE: 98.1 F | RESPIRATION RATE: 16 BRPM

## 2025-03-20 DIAGNOSIS — K08.89 PAIN, DENTAL: Primary | ICD-10-CM

## 2025-03-20 PROCEDURE — 99283 EMERGENCY DEPT VISIT LOW MDM: CPT

## 2025-03-20 RX ORDER — CEPHALEXIN 500 MG/1
500 CAPSULE ORAL 3 TIMES DAILY
Qty: 21 CAPSULE | Refills: 0 | Status: SHIPPED | OUTPATIENT
Start: 2025-03-20 | End: 2025-03-27

## 2025-03-20 ASSESSMENT — PAIN SCALES - GENERAL: PAINLEVEL_OUTOF10: 7

## 2025-03-20 ASSESSMENT — PAIN DESCRIPTION - LOCATION: LOCATION: MOUTH

## 2025-03-20 NOTE — DISCHARGE INSTRUCTIONS
Use meds as directed along with over the counter tylenol  or motrin, see your dentist for recheck    Strong peripheral pulses
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
## 2025-07-11 ENCOUNTER — HOSPITAL ENCOUNTER (EMERGENCY)
Age: 25
Discharge: HOME | End: 2025-07-11
Attending: EMERGENCY MEDICINE
Payer: MEDICAID

## 2025-07-11 VITALS
RESPIRATION RATE: 18 BRPM | SYSTOLIC BLOOD PRESSURE: 157 MMHG | HEIGHT: 63 IN | TEMPERATURE: 98 F | HEART RATE: 88 BPM | WEIGHT: 240 LBS | OXYGEN SATURATION: 99 % | DIASTOLIC BLOOD PRESSURE: 98 MMHG | BODY MASS INDEX: 42.52 KG/M2

## 2025-07-11 DIAGNOSIS — H81.10 BENIGN PAROXYSMAL POSITIONAL VERTIGO, UNSPECIFIED LATERALITY: Primary | ICD-10-CM

## 2025-07-11 LAB
ALBUMIN SERPL-MCNC: 3.8 G/DL (ref 3.5–5)
ALBUMIN/GLOB SERPL: 1 (ref 1–1.9)
ALP SERPL-CCNC: 89 U/L (ref 35–104)
ALT SERPL-CCNC: 20 U/L (ref 8–45)
ANION GAP SERPL CALC-SCNC: 13 MMOL/L (ref 7–16)
AST SERPL-CCNC: 23 U/L (ref 15–37)
BASOPHILS # BLD: 0.05 K/UL (ref 0–0.2)
BASOPHILS NFR BLD: 0.5 % (ref 0–2)
BILIRUB SERPL-MCNC: 0.5 MG/DL (ref 0–1.2)
BILIRUB UR QL: NEGATIVE
BUN SERPL-MCNC: 7 MG/DL (ref 6–23)
CALCIUM SERPL-MCNC: 9.7 MG/DL (ref 8.8–10.2)
CHLORIDE SERPL-SCNC: 100 MMOL/L (ref 98–107)
CO2 SERPL-SCNC: 25 MMOL/L (ref 20–29)
CREAT SERPL-MCNC: 0.84 MG/DL (ref 0.6–1.1)
DIFFERENTIAL METHOD BLD: ABNORMAL
EOSINOPHIL # BLD: 0.12 K/UL (ref 0–0.8)
EOSINOPHIL NFR BLD: 1.2 % (ref 0.5–7.8)
ERYTHROCYTE [DISTWIDTH] IN BLOOD BY AUTOMATED COUNT: 13.5 % (ref 11.9–14.6)
GLOBULIN SER CALC-MCNC: 3.8 G/DL (ref 2.3–3.5)
GLUCOSE SERPL-MCNC: 97 MG/DL (ref 70–99)
GLUCOSE UR QL STRIP.AUTO: NEGATIVE MG/DL
HCG UR QL: NEGATIVE
HCT VFR BLD AUTO: 41.6 % (ref 35.8–46.3)
HGB BLD-MCNC: 13.8 G/DL (ref 11.7–15.4)
IMM GRANULOCYTES # BLD AUTO: 0.04 K/UL (ref 0–0.5)
IMM GRANULOCYTES NFR BLD AUTO: 0.4 % (ref 0–5)
KETONES UR-MCNC: NEGATIVE MG/DL
LEUKOCYTE ESTERASE UR QL STRIP: NEGATIVE
LYMPHOCYTES # BLD: 3.14 K/UL (ref 0.5–4.6)
LYMPHOCYTES NFR BLD: 32.1 % (ref 13–44)
MCH RBC QN AUTO: 28.5 PG (ref 26.1–32.9)
MCHC RBC AUTO-ENTMCNC: 33.2 G/DL (ref 31.4–35)
MCV RBC AUTO: 86 FL (ref 82–102)
MONOCYTES # BLD: 0.64 K/UL (ref 0.1–1.3)
MONOCYTES NFR BLD: 6.5 % (ref 4–12)
NEUTS SEG # BLD: 5.79 K/UL (ref 1.7–8.2)
NEUTS SEG NFR BLD: 59.3 % (ref 43–78)
NITRITE UR QL: NEGATIVE
NRBC # BLD: 0 K/UL (ref 0–0.2)
PH UR: 7 (ref 5–9)
PLATELET # BLD AUTO: 348 K/UL (ref 150–450)
PMV BLD AUTO: 8.9 FL (ref 9.4–12.3)
POTASSIUM SERPL-SCNC: 3.6 MMOL/L (ref 3.5–5.1)
PROT SERPL-MCNC: 7.6 G/DL (ref 6.3–8.2)
PROT UR QL: NEGATIVE MG/DL
RBC # BLD AUTO: 4.84 M/UL (ref 4.05–5.2)
RBC # UR STRIP: NEGATIVE
SERVICE CMNT-IMP: NORMAL
SODIUM SERPL-SCNC: 138 MMOL/L (ref 136–145)
SP GR UR: 1.01 (ref 1–1.02)
UROBILINOGEN UR QL: 0.2 EU/DL (ref 0.2–1)
WBC # BLD AUTO: 9.8 K/UL (ref 4.3–11.1)

## 2025-07-11 PROCEDURE — 81003 URINALYSIS AUTO W/O SCOPE: CPT

## 2025-07-11 PROCEDURE — 99284 EMERGENCY DEPT VISIT MOD MDM: CPT

## 2025-07-11 PROCEDURE — 85025 COMPLETE CBC W/AUTO DIFF WBC: CPT

## 2025-07-11 PROCEDURE — 93005 ELECTROCARDIOGRAM TRACING: CPT | Performed by: EMERGENCY MEDICINE

## 2025-07-11 PROCEDURE — 80053 COMPREHEN METABOLIC PANEL: CPT

## 2025-07-11 PROCEDURE — 6370000000 HC RX 637 (ALT 250 FOR IP): Performed by: EMERGENCY MEDICINE

## 2025-07-11 PROCEDURE — 81025 URINE PREGNANCY TEST: CPT

## 2025-07-11 RX ORDER — MECLIZINE HYDROCHLORIDE 25 MG/1
25 TABLET ORAL
Status: COMPLETED | OUTPATIENT
Start: 2025-07-11 | End: 2025-07-11

## 2025-07-11 RX ORDER — MECLIZINE HYDROCHLORIDE 25 MG/1
25 TABLET ORAL 3 TIMES DAILY PRN
Qty: 15 TABLET | Refills: 0 | Status: SHIPPED | OUTPATIENT
Start: 2025-07-11 | End: 2025-07-21

## 2025-07-11 RX ADMIN — MECLIZINE HYDROCHLORIDE 25 MG: 25 TABLET ORAL at 21:31

## 2025-07-11 ASSESSMENT — PAIN SCALES - GENERAL: PAINLEVEL_OUTOF10: 7

## 2025-07-11 ASSESSMENT — LIFESTYLE VARIABLES
HOW MANY STANDARD DRINKS CONTAINING ALCOHOL DO YOU HAVE ON A TYPICAL DAY: PATIENT DOES NOT DRINK
HOW OFTEN DO YOU HAVE A DRINK CONTAINING ALCOHOL: NEVER

## 2025-07-12 LAB
EKG ATRIAL RATE: 96 BPM
EKG DIAGNOSIS: NORMAL
EKG P AXIS: 45 DEGREES
EKG P-R INTERVAL: 132 MS
EKG Q-T INTERVAL: 332 MS
EKG QRS DURATION: 92 MS
EKG QTC CALCULATION (BAZETT): 419 MS
EKG R AXIS: 29 DEGREES
EKG T AXIS: 20 DEGREES
EKG VENTRICULAR RATE: 96 BPM

## 2025-07-12 PROCEDURE — 93010 ELECTROCARDIOGRAM REPORT: CPT | Performed by: INTERNAL MEDICINE

## 2025-07-12 NOTE — ED PROVIDER NOTES
VI palsy on the left.  Presents to the emergency department with complaint of dizziness and back pain.  Patient reports that she has been getting intermittent episodes where she will feel dizzy like the room is spinning.  These symptoms are worse when she moves.  They get better when she rests.  Denies any headaches, head injuries, or fevers.  She also states that she has been having pain in her back.  Pain in her back is worse with certain movements.  Denies any known urinary symptoms but is worried about possible kidney infection or injury.        Physical Exam     Vitals signs and nursing note reviewed:  Vitals:    07/11/25 2044 07/11/25 2045   BP:  (!) 157/98   Pulse:  95   Resp:  16   Temp:  98 °F (36.7 °C)   TempSrc:  Oral   SpO2:  98%   Weight: 108.9 kg (240 lb)    Height: 1.6 m (5' 3\")       Physical Exam  Vitals and nursing note reviewed.   Constitutional:       General: She is not in acute distress.     Appearance: Normal appearance. She is not ill-appearing, toxic-appearing or diaphoretic.   HENT:      Head: Normocephalic and atraumatic.      Right Ear: Tympanic membrane normal.      Left Ear: Tympanic membrane normal.      Nose: Nose normal. No congestion or rhinorrhea.      Mouth/Throat:      Mouth: Mucous membranes are moist.      Pharynx: Oropharynx is clear. No oropharyngeal exudate or posterior oropharyngeal erythema.   Eyes:      Pupils: Pupils are equal, round, and reactive to light.      Comments: Left cranial nerve VI palsy   Cardiovascular:      Rate and Rhythm: Normal rate and regular rhythm.      Pulses: Normal pulses.   Pulmonary:      Effort: Pulmonary effort is normal. No respiratory distress.      Breath sounds: Normal breath sounds. No stridor. No wheezing, rhonchi or rales.   Chest:      Chest wall: No tenderness.   Abdominal:      General: Abdomen is flat. Bowel sounds are normal. There is no distension.      Palpations: Abdomen is soft. There is no mass.      Tenderness: There is no

## 2025-07-12 NOTE — ED TRIAGE NOTES
Pt presents to ED with c/o lower back pain and dizziness. Pt states she has been having these symptoms for the past two days.

## 2025-08-12 ENCOUNTER — TELEMEDICINE (OUTPATIENT)
Dept: BEHAVIORAL/MENTAL HEALTH CLINIC | Age: 25
End: 2025-08-12
Payer: MEDICAID

## 2025-08-12 DIAGNOSIS — F51.01 PRIMARY INSOMNIA: ICD-10-CM

## 2025-08-12 DIAGNOSIS — F41.1 GENERALIZED ANXIETY DISORDER WITH PANIC ATTACKS: ICD-10-CM

## 2025-08-12 DIAGNOSIS — F60.3 BORDERLINE PERSONALITY DISORDER (HCC): ICD-10-CM

## 2025-08-12 DIAGNOSIS — F41.0 GENERALIZED ANXIETY DISORDER WITH PANIC ATTACKS: ICD-10-CM

## 2025-08-12 DIAGNOSIS — F31.32 BIPOLAR AFFECTIVE DISORDER, CURRENTLY DEPRESSED, MODERATE (HCC): Primary | ICD-10-CM

## 2025-08-12 PROCEDURE — 99214 OFFICE O/P EST MOD 30 MIN: CPT | Performed by: PSYCHIATRY & NEUROLOGY

## 2025-08-12 RX ORDER — GABAPENTIN 300 MG/1
300-600 CAPSULE ORAL NIGHTLY
Qty: 60 CAPSULE | Refills: 3 | Status: CANCELLED | OUTPATIENT
Start: 2025-08-12 | End: 2025-12-10

## 2025-08-12 RX ORDER — HYDROXYZINE PAMOATE 25 MG/1
25 CAPSULE ORAL 4 TIMES DAILY PRN
Status: CANCELLED | OUTPATIENT
Start: 2025-08-12

## 2025-08-12 RX ORDER — TOPIRAMATE 25 MG/1
25 TABLET, FILM COATED ORAL 2 TIMES DAILY
COMMUNITY
Start: 2025-08-11 | End: 2025-08-12 | Stop reason: SDUPTHER

## 2025-08-12 RX ORDER — DEXTROAMPHETAMINE SACCHARATE, AMPHETAMINE ASPARTATE, DEXTROAMPHETAMINE SULFATE AND AMPHETAMINE SULFATE 2.5; 2.5; 2.5; 2.5 MG/1; MG/1; MG/1; MG/1
10 TABLET ORAL DAILY
Qty: 30 TABLET | Refills: 0 | Status: CANCELLED | OUTPATIENT
Start: 2025-08-12 | End: 2025-09-11

## 2025-08-12 RX ORDER — ARIPIPRAZOLE 10 MG/1
10 TABLET ORAL DAILY
Qty: 30 TABLET | Refills: 3 | Status: CANCELLED | OUTPATIENT
Start: 2025-08-12

## 2025-08-12 RX ORDER — HYDROXYZINE PAMOATE 25 MG/1
CAPSULE ORAL
COMMUNITY
Start: 2025-08-11 | End: 2025-08-14

## 2025-08-12 RX ORDER — TOPIRAMATE 100 MG/1
150 TABLET, FILM COATED ORAL 2 TIMES DAILY
Qty: 90 TABLET | Refills: 3 | Status: CANCELLED | OUTPATIENT
Start: 2025-08-12

## 2025-08-12 RX ORDER — GABAPENTIN 100 MG/1
100 CAPSULE ORAL 3 TIMES DAILY
Qty: 90 CAPSULE | Refills: 2 | Status: SHIPPED | OUTPATIENT
Start: 2025-08-12 | End: 2025-11-10

## 2025-08-12 RX ORDER — LITHIUM CARBONATE 150 MG/1
150 CAPSULE ORAL DAILY
Qty: 30 CAPSULE | Refills: 3 | Status: SHIPPED | OUTPATIENT
Start: 2025-08-12 | End: 2025-08-12 | Stop reason: CLARIF

## 2025-08-12 RX ORDER — ESCITALOPRAM OXALATE 10 MG/1
10 TABLET ORAL DAILY
Qty: 30 TABLET | Refills: 3 | Status: SHIPPED | OUTPATIENT
Start: 2025-08-12 | End: 2025-08-14 | Stop reason: SDUPTHER

## 2025-08-12 RX ORDER — BUPROPION HYDROCHLORIDE 150 MG/1
150 TABLET ORAL EVERY MORNING
Qty: 30 TABLET | Refills: 3 | Status: CANCELLED | OUTPATIENT
Start: 2025-08-12

## 2025-08-12 RX ORDER — TOPIRAMATE 25 MG/1
25 TABLET, FILM COATED ORAL 2 TIMES DAILY
Qty: 60 TABLET | Refills: 3 | Status: SHIPPED | OUTPATIENT
Start: 2025-08-12

## 2025-08-12 ASSESSMENT — PATIENT HEALTH QUESTIONNAIRE - PHQ9
SUM OF ALL RESPONSES TO PHQ QUESTIONS 1-9: 11
5. POOR APPETITE OR OVEREATING: NEARLY EVERY DAY
8. MOVING OR SPEAKING SO SLOWLY THAT OTHER PEOPLE COULD HAVE NOTICED. OR THE OPPOSITE, BEING SO FIGETY OR RESTLESS THAT YOU HAVE BEEN MOVING AROUND A LOT MORE THAN USUAL: NOT AT ALL
1. LITTLE INTEREST OR PLEASURE IN DOING THINGS: SEVERAL DAYS
10. IF YOU CHECKED OFF ANY PROBLEMS, HOW DIFFICULT HAVE THESE PROBLEMS MADE IT FOR YOU TO DO YOUR WORK, TAKE CARE OF THINGS AT HOME, OR GET ALONG WITH OTHER PEOPLE: SOMEWHAT DIFFICULT
SUM OF ALL RESPONSES TO PHQ QUESTIONS 1-9: 11
4. FEELING TIRED OR HAVING LITTLE ENERGY: NEARLY EVERY DAY
SUM OF ALL RESPONSES TO PHQ QUESTIONS 1-9: 11
2. FEELING DOWN, DEPRESSED OR HOPELESS: SEVERAL DAYS
SUM OF ALL RESPONSES TO PHQ QUESTIONS 1-9: 11
3. TROUBLE FALLING OR STAYING ASLEEP: NEARLY EVERY DAY
9. THOUGHTS THAT YOU WOULD BE BETTER OFF DEAD, OR OF HURTING YOURSELF: NOT AT ALL
6. FEELING BAD ABOUT YOURSELF - OR THAT YOU ARE A FAILURE OR HAVE LET YOURSELF OR YOUR FAMILY DOWN: NOT AT ALL
7. TROUBLE CONCENTRATING ON THINGS, SUCH AS READING THE NEWSPAPER OR WATCHING TELEVISION: NOT AT ALL

## 2025-08-13 ENCOUNTER — TELEPHONE (OUTPATIENT)
Dept: BEHAVIORAL/MENTAL HEALTH CLINIC | Age: 25
End: 2025-08-13

## 2025-08-14 RX ORDER — ESCITALOPRAM OXALATE 20 MG/1
20 TABLET ORAL DAILY
Qty: 30 TABLET | Refills: 3 | Status: SHIPPED | OUTPATIENT
Start: 2025-08-14

## 2025-09-05 ENCOUNTER — OFFICE VISIT (OUTPATIENT)
Dept: BEHAVIORAL/MENTAL HEALTH CLINIC | Age: 25
End: 2025-09-05
Payer: MEDICAID

## 2025-09-05 VITALS
HEART RATE: 86 BPM | WEIGHT: 249 LBS | HEIGHT: 63 IN | DIASTOLIC BLOOD PRESSURE: 80 MMHG | SYSTOLIC BLOOD PRESSURE: 112 MMHG | RESPIRATION RATE: 16 BRPM | OXYGEN SATURATION: 98 % | TEMPERATURE: 97.8 F | BODY MASS INDEX: 44.12 KG/M2

## 2025-09-05 DIAGNOSIS — F51.01 PRIMARY INSOMNIA: ICD-10-CM

## 2025-09-05 DIAGNOSIS — F41.0 GENERALIZED ANXIETY DISORDER WITH PANIC ATTACKS: ICD-10-CM

## 2025-09-05 DIAGNOSIS — F31.75 BIPOLAR DISORDER, IN PARTIAL REMISSION, MOST RECENT EPISODE DEPRESSED (HCC): Primary | ICD-10-CM

## 2025-09-05 DIAGNOSIS — E66.813 OBESITY, CLASS III, BMI 40-49.9 (MORBID OBESITY) (HCC): ICD-10-CM

## 2025-09-05 DIAGNOSIS — F41.1 GENERALIZED ANXIETY DISORDER WITH PANIC ATTACKS: ICD-10-CM

## 2025-09-05 DIAGNOSIS — F60.3 BORDERLINE PERSONALITY DISORDER (HCC): ICD-10-CM

## 2025-09-05 DIAGNOSIS — F50.810 MILD BINGE-EATING DISORDER: ICD-10-CM

## 2025-09-05 PROCEDURE — 99214 OFFICE O/P EST MOD 30 MIN: CPT | Performed by: PSYCHIATRY & NEUROLOGY

## 2025-09-05 RX ORDER — TOPIRAMATE 25 MG/1
25 TABLET, FILM COATED ORAL 2 TIMES DAILY
Qty: 60 TABLET | Refills: 3 | Status: CANCELLED | OUTPATIENT
Start: 2025-09-05

## 2025-09-05 RX ORDER — ESCITALOPRAM OXALATE 10 MG/1
TABLET ORAL
Qty: 30 TABLET | Refills: 3 | Status: SHIPPED | OUTPATIENT
Start: 2025-09-05

## 2025-09-05 RX ORDER — ESCITALOPRAM OXALATE 20 MG/1
20 TABLET ORAL DAILY
Qty: 30 TABLET | Refills: 3 | Status: CANCELLED | OUTPATIENT
Start: 2025-09-05

## 2025-09-05 RX ORDER — GABAPENTIN 100 MG/1
100 CAPSULE ORAL 3 TIMES DAILY
Qty: 90 CAPSULE | Refills: 2 | Status: CANCELLED | OUTPATIENT
Start: 2025-09-05 | End: 2025-12-04

## 2025-09-05 ASSESSMENT — PATIENT HEALTH QUESTIONNAIRE - PHQ9
SUM OF ALL RESPONSES TO PHQ QUESTIONS 1-9: 3
4. FEELING TIRED OR HAVING LITTLE ENERGY: NOT AT ALL
3. TROUBLE FALLING OR STAYING ASLEEP: NOT AT ALL
SUM OF ALL RESPONSES TO PHQ QUESTIONS 1-9: 3
7. TROUBLE CONCENTRATING ON THINGS, SUCH AS READING THE NEWSPAPER OR WATCHING TELEVISION: NOT AT ALL
10. IF YOU CHECKED OFF ANY PROBLEMS, HOW DIFFICULT HAVE THESE PROBLEMS MADE IT FOR YOU TO DO YOUR WORK, TAKE CARE OF THINGS AT HOME, OR GET ALONG WITH OTHER PEOPLE: NOT DIFFICULT AT ALL
8. MOVING OR SPEAKING SO SLOWLY THAT OTHER PEOPLE COULD HAVE NOTICED. OR THE OPPOSITE, BEING SO FIGETY OR RESTLESS THAT YOU HAVE BEEN MOVING AROUND A LOT MORE THAN USUAL: NEARLY EVERY DAY
1. LITTLE INTEREST OR PLEASURE IN DOING THINGS: NOT AT ALL
5. POOR APPETITE OR OVEREATING: NOT AT ALL
9. THOUGHTS THAT YOU WOULD BE BETTER OFF DEAD, OR OF HURTING YOURSELF: NOT AT ALL
2. FEELING DOWN, DEPRESSED OR HOPELESS: NOT AT ALL
6. FEELING BAD ABOUT YOURSELF - OR THAT YOU ARE A FAILURE OR HAVE LET YOURSELF OR YOUR FAMILY DOWN: NOT AT ALL